# Patient Record
Sex: MALE | Race: WHITE | NOT HISPANIC OR LATINO | Employment: UNEMPLOYED | URBAN - METROPOLITAN AREA
[De-identification: names, ages, dates, MRNs, and addresses within clinical notes are randomized per-mention and may not be internally consistent; named-entity substitution may affect disease eponyms.]

---

## 2023-03-20 DIAGNOSIS — M54.12 CERVICAL RADICULOPATHY: Primary | ICD-10-CM

## 2023-04-20 ENCOUNTER — OFFICE VISIT (OUTPATIENT)
Dept: PAIN MEDICINE | Facility: CLINIC | Age: 73
End: 2023-04-20
Payer: MEDICARE

## 2023-04-20 VITALS
HEART RATE: 67 BPM | WEIGHT: 226 LBS | BODY MASS INDEX: 31.64 KG/M2 | SYSTOLIC BLOOD PRESSURE: 151 MMHG | DIASTOLIC BLOOD PRESSURE: 88 MMHG | OXYGEN SATURATION: 97 % | HEIGHT: 71 IN

## 2023-04-20 DIAGNOSIS — M47.812 CERVICAL SPONDYLOSIS: ICD-10-CM

## 2023-04-20 DIAGNOSIS — G89.29 CHRONIC PAIN OF BOTH SHOULDERS: ICD-10-CM

## 2023-04-20 DIAGNOSIS — G89.29 CHRONIC RIGHT-SIDED LOW BACK PAIN WITH RIGHT-SIDED SCIATICA: ICD-10-CM

## 2023-04-20 DIAGNOSIS — M25.512 CHRONIC PAIN OF BOTH SHOULDERS: ICD-10-CM

## 2023-04-20 DIAGNOSIS — M54.2 CHRONIC NECK PAIN: Primary | ICD-10-CM

## 2023-04-20 DIAGNOSIS — M25.511 CHRONIC PAIN OF BOTH SHOULDERS: ICD-10-CM

## 2023-04-20 DIAGNOSIS — M43.12 SPONDYLOLISTHESIS OF CERVICAL REGION: ICD-10-CM

## 2023-04-20 DIAGNOSIS — M54.12 CERVICAL RADICULOPATHY: ICD-10-CM

## 2023-04-20 DIAGNOSIS — M75.42 IMPINGEMENT SYNDROME OF BOTH SHOULDERS: ICD-10-CM

## 2023-04-20 DIAGNOSIS — R29.3 POOR POSTURE: ICD-10-CM

## 2023-04-20 DIAGNOSIS — G89.29 CHRONIC NECK PAIN: Primary | ICD-10-CM

## 2023-04-20 DIAGNOSIS — M75.41 IMPINGEMENT SYNDROME OF BOTH SHOULDERS: ICD-10-CM

## 2023-04-20 DIAGNOSIS — M54.41 CHRONIC RIGHT-SIDED LOW BACK PAIN WITH RIGHT-SIDED SCIATICA: ICD-10-CM

## 2023-04-20 PROCEDURE — 3077F SYST BP >= 140 MM HG: CPT | Mod: CPTII,S$GLB,, | Performed by: PHYSICAL MEDICINE & REHABILITATION

## 2023-04-20 PROCEDURE — 3008F BODY MASS INDEX DOCD: CPT | Mod: CPTII,S$GLB,, | Performed by: PHYSICAL MEDICINE & REHABILITATION

## 2023-04-20 PROCEDURE — 1160F RVW MEDS BY RX/DR IN RCRD: CPT | Mod: CPTII,S$GLB,, | Performed by: PHYSICAL MEDICINE & REHABILITATION

## 2023-04-20 PROCEDURE — 1159F MED LIST DOCD IN RCRD: CPT | Mod: CPTII,S$GLB,, | Performed by: PHYSICAL MEDICINE & REHABILITATION

## 2023-04-20 PROCEDURE — 4010F ACE/ARB THERAPY RXD/TAKEN: CPT | Mod: CPTII,S$GLB,, | Performed by: PHYSICAL MEDICINE & REHABILITATION

## 2023-04-20 PROCEDURE — 99204 OFFICE O/P NEW MOD 45 MIN: CPT | Mod: S$GLB,,, | Performed by: PHYSICAL MEDICINE & REHABILITATION

## 2023-04-20 PROCEDURE — 3008F PR BODY MASS INDEX (BMI) DOCUMENTED: ICD-10-PCS | Mod: CPTII,S$GLB,, | Performed by: PHYSICAL MEDICINE & REHABILITATION

## 2023-04-20 PROCEDURE — 4010F PR ACE/ARB THEARPY RXD/TAKEN: ICD-10-PCS | Mod: CPTII,S$GLB,, | Performed by: PHYSICAL MEDICINE & REHABILITATION

## 2023-04-20 PROCEDURE — 3079F DIAST BP 80-89 MM HG: CPT | Mod: CPTII,S$GLB,, | Performed by: PHYSICAL MEDICINE & REHABILITATION

## 2023-04-20 PROCEDURE — 1160F PR REVIEW ALL MEDS BY PRESCRIBER/CLIN PHARMACIST DOCUMENTED: ICD-10-PCS | Mod: CPTII,S$GLB,, | Performed by: PHYSICAL MEDICINE & REHABILITATION

## 2023-04-20 PROCEDURE — 3079F PR MOST RECENT DIASTOLIC BLOOD PRESSURE 80-89 MM HG: ICD-10-PCS | Mod: CPTII,S$GLB,, | Performed by: PHYSICAL MEDICINE & REHABILITATION

## 2023-04-20 PROCEDURE — 99204 PR OFFICE/OUTPT VISIT, NEW, LEVL IV, 45-59 MIN: ICD-10-PCS | Mod: S$GLB,,, | Performed by: PHYSICAL MEDICINE & REHABILITATION

## 2023-04-20 PROCEDURE — 1159F PR MEDICATION LIST DOCUMENTED IN MEDICAL RECORD: ICD-10-PCS | Mod: CPTII,S$GLB,, | Performed by: PHYSICAL MEDICINE & REHABILITATION

## 2023-04-20 PROCEDURE — 3077F PR MOST RECENT SYSTOLIC BLOOD PRESSURE >= 140 MM HG: ICD-10-PCS | Mod: CPTII,S$GLB,, | Performed by: PHYSICAL MEDICINE & REHABILITATION

## 2023-04-20 RX ORDER — VALSARTAN 160 MG/1
160 TABLET ORAL NIGHTLY
COMMUNITY
Start: 2023-03-02

## 2023-04-20 RX ORDER — GABAPENTIN 300 MG/1
300 CAPSULE ORAL NIGHTLY
Qty: 30 CAPSULE | Refills: 11 | Status: SHIPPED | OUTPATIENT
Start: 2023-04-20 | End: 2024-04-14

## 2023-04-20 RX ORDER — NAPROXEN SODIUM 220 MG
220 TABLET ORAL 2 TIMES DAILY WITH MEALS
COMMUNITY

## 2023-04-20 RX ORDER — ROSUVASTATIN CALCIUM 20 MG/1
20 TABLET, COATED ORAL NIGHTLY
COMMUNITY
Start: 2023-03-16

## 2023-04-20 RX ORDER — DULAGLUTIDE 3 MG/.5ML
INJECTION, SOLUTION SUBCUTANEOUS
COMMUNITY
Start: 2023-04-03

## 2023-04-20 NOTE — PROGRESS NOTES
[facility]  New Patient H&P    Referring Provider: Brian Cornejo Md  [address]  [address]  [place] [address]    Chief Complaint:   Chief Complaint   Patient presents with    Neck Pain       History of Present Illness: Ricky Yeboah is a 73 y.o. male referred by Dr. Brian Cornejo for neck pain.      Neck pain  Onset: about a year ago, no specific inciting event and pain worsened about 6 months ago.   Location: bilateral neck and bilateral shuolders  Radiation: into back of head and into bilateral arms down to hands (whole hand)  Timing: intermittent  Quality: Tight and Numb  Exacerbating Factors: nothing in particular  Alleviating Factors: nothing and medications    Back pain has been present for many years. Pain started when he was running track and doing hurdles in high school. Pain is localized to the right SIJ area with occasional radiation down the right posterior leg to the calf at times. The pain is aggravated by mowing grass in riding lawnmower. The pain is alleviated by heel lift in the right shoe, naproxen. He denies weakness or numbness in the legs. Denies changes in bowel or bladder function. Denies saddle anesthesia. Denies recent fevers or infections. Denies unexplained weight loss    Associated Symptoms: He gets numbness in both arms and hands, primarily in the left. He felt weak in both arms, primarily the left. He denies night fever/night sweats, urinary incontinence/change in function, bowel incontinence/change in function, and unexplained weight loss    He saw a chiropractor which helped.     Severity: Currently: 4/10   Typical Range: 4-5/10     Exacerbation: 7/10     P = 7  E = 4  G = 3  Baseline PEG Score = 4.67  Current PEG Score: 4.67    Opioid Risk Score         Value Time User    Opioid Risk Score  0 4/20/2023  2:13 PM Dena Tom MD             Previous Interventions:  -     Previous Therapies:  PT/OT: no   Chiropractor: Yes  Relevant Surgery: no  "  Previous Medications:   - NSAIDS: naproxen and tylenol help.   - Muscle Relaxants:    - TCAs:   - SNRIs:   - Topicals:   - Anticonvulsants:    - Opioids:     Current Pain Medications:  Tylenol  Naproxen     Blood Thinners: None    Full Medication List:    Current Outpatient Medications:     acetaminophen (TYLENOL ARTHRITIS ORAL), Take by mouth., Disp: , Rfl:     naproxen sodium (ANAPROX) 220 MG tablet, Take 220 mg by mouth 2 (two) times daily with meals., Disp: , Rfl:     rosuvastatin (CRESTOR) 20 MG tablet, Take 20 mg by mouth every evening., Disp: , Rfl:     TRULICITY 3 mg/0.5 mL pen injector, INJECT 1 SUBCUTANEOUSLY ONCE A WEEK, Disp: , Rfl:     valsartan (DIOVAN) 160 MG tablet, Take 160 mg by mouth every evening., Disp: , Rfl:     gabapentin (NEURONTIN) 300 MG capsule, Take 1 capsule (300 mg total) by mouth every evening., Disp: 30 capsule, Rfl: 11     Review of Systems:  ROS    Allergies:  Patient has no known allergies.     Medical History:   has a past medical history of DM (diabetes mellitus), HTN (hypertension), and Mixed hyperlipidemia.    Surgical History:   has a past surgical history that includes Vasectomy; Cataract extraction; and Hernia repair.    Family History:  family history includes Heart disease in his father.    Social History:   reports that he has never smoked. He has never used smokeless tobacco. He reports that he does not currently use alcohol.    Physical Exam:  BP (!) 151/88 (BP Location: Left arm, Patient Position: Sitting)   Pulse 67   Ht 5' 11" (1.803 m)   Wt 102.5 kg (226 lb)   SpO2 97%   BMI 31.52 kg/m²   GEN: No acute distress. Calm, comfortable  HENT: Normocephalic, atraumatic, moist mucous membranes  EYE: Anicteric sclera, non-injected.   CV: Non-diaphoretic. Regular Rate. Radial Pulses 2+.  RESP: Breathing comfortably. Chest expansion symmetric.  EXT: No clubbing, cyanosis.   SKIN: Warm, & dry to palpation. No visible rashes or lesions of exposed skin.   PSYCH: Pleasant " mood and appropriate affect. Recent and remote memory intact.   GAIT: Independent, normal ambulation  Neck Exam:       Inspection: No erythema, bruising. Forward head and rounded shoulders      Palpation: (+) TTP of bilateral cervical paraspinals, b/l trapezius      ROM:  Limitation in extension, lateral bending b/l or right rotation. Pain with extension and right rotation      Provocative Maneuvers:  (-) Spurling's bilaterally  Shoulder Exam:       Inspection: No erythema, bruising.       Palpation: (+) TTP of b/l proximal biceps tendon and left subacromial area.       ROM:  Limited in abduction, internal rotation b/l      Provocative Maneuvers:  (+) Hawkin's b/l       (+) Empty Can b/l       (+) Speed's on left   (-) Drop arm b/l  Lumbar Spine Exam:       Inspection: No erythema, bruising. Loss of lordosis      Palpation: (+) TTP of lumbar paraspinals and SIJ on right       ROM:  Limited in flexion, extension, lateral bending.       (+) Facet loading on right      (-) Straight Leg Raise bilaterally      (+) GUS on right  Neurologic Exam:     Alert. Speech is fluent and appropriate.     Strength: 4/5 in left elbow flex/ext and finger flexion, otherwise 5/5 throughout bilateral upper & lower extremities     Sensation:  Grossly intact to light touch in bilateral upper & lower extremities     Reflexes: 2+ in b/l patella, achilles, biceps, brachioradialis, triceps     Tone: No abnormality appreciated in bilateral upper or lower extremities     (-) Gonzáles bilaterally            Imaging:  - MRI C-spine 1/17/23:   The height of the cervical vertebral bodies is maintained. There is slight reversal of the normal lordotic curvature which may be due to muscle spasm or patient positioning. There is some disc space narrowing and loss of disc signal from the C2-3 through C6-7 levels consistent with disc desiccation discogenic change. Just above the C1 level at the junction of the lower medulla and most upper aspect of the  cord is a very small 1-2 mm focus of increased signal within the cord. This is of uncertain significance. This could represent a small amount of fluid within the central canal. No axial images through this level were obtained. Some additional images of the upper cord in this region with and without contrast are recommended to exclude a contrast enhancing lesion. The cervical cord is otherwise unremarkable.  C2-3: No disc herniation central canal stenosis or neural canal narrowing is seen. Note is made of some hypertrophic arthropathic changes of the dens but no mass-effect on the cord or central canal compromise at the C1 level is seen.  C3-4: There is a broad-based disc herniation or disc osteophyte complex extending approximately 3 mm beyond the vertebral body margin effacing the ventral aspect of the thecal sac but causing no cord deformity or central canal stenosis. There is neural canal narrowing bilaterally due to uncinate facet hypertrophy.  C4-5: There is 1-2 mm of disc bulging and osteophytic ridging. No mass-effect on the cord is seen. No central canal stenosis is seen. There is neural canal narrowing bilaterally left greater than right due to uncinate facet hypertrophy.  C5-6: There is a broad-based disc herniation or disc osteophyte complex eccentric to the right extending approximately 2-3 mm beyond the vertebral body margin abutting or minimally impinging on the right ventral aspect of the cord. No central canal stenosis is seen. There is neural canal narrowing bilaterally due to uncinate facet hypertrophy.  C6-7: There is disc bulging and osteophytic ridging with some mass effect on the thecal sac but no mass-effect on the cord. Disc extends approximately 1-2 mm beyond the vertebral body margin. No central canal stenosis is seen. There is neural canal narrowing bilaterally due to uncinate facet hypertrophy  C7-T1: There is mild neural canal narrowing due to uncinate facet hypertrophy bilaterally. No  central canal stenosis is seen. No disc herniation is seen      Labs:  BMP  No results found for: NA, K, CL, CO2, BUN, CREATININE, CALCIUM, ANIONGAP, EGFRNORACEVR  No results found for: ALT, AST, GGT, ALKPHOS, BILITOT  No results found for: PLT    Assessment:  Ricky Yeboah is a 73 y.o. male with the following diagnoses based on history, exam, and imaging:    Problem List Items Addressed This Visit    None  Visit Diagnoses       Chronic neck pain    -  Primary    Relevant Medications    gabapentin (NEURONTIN) 300 MG capsule    Other Relevant Orders    Ambulatory referral/consult to Physical/Occupational Therapy    X-Ray Cervical Spine AP Lat with Flexion  Extension    Chronic pain of both shoulders        Relevant Medications    gabapentin (NEURONTIN) 300 MG capsule    Other Relevant Orders    X-Ray Shoulder Complete Bilateral    Poor posture        Impingement syndrome of both shoulders        Relevant Medications    gabapentin (NEURONTIN) 300 MG capsule    Other Relevant Orders    Ambulatory referral/consult to Physical/Occupational Therapy    X-Ray Shoulder Complete Bilateral    Cervical radiculopathy        Relevant Medications    gabapentin (NEURONTIN) 300 MG capsule    Other Relevant Orders    X-Ray Cervical Spine AP Lat with Flexion  Extension    Chronic right-sided low back pain with right-sided sciatica        Relevant Medications    gabapentin (NEURONTIN) 300 MG capsule    Other Relevant Orders    Ambulatory referral/consult to Physical/Occupational Therapy    X-Ray Lumbar Spine AP And Lateral    Spondylolisthesis of cervical region        Relevant Orders    X-Ray Cervical Spine AP Lat with Flexion  Extension    Cervical spondylosis        Relevant Orders    X-Ray Cervical Spine AP Lat with Flexion  Extension            This is a pleasant 73 y.o. gentleman presenting with:     - Chronic neck pain and bilateral radicular arm pains: Bilateral foraminal stenosis at multiple levels. Diffuse facet  arthropathy.  - Chronic bilateral shoulder pain: Impingement signs present b/l with biceps tendonopathy on the left as well.  - Chronic right low back pain primarily over right SIJ and with (+) facet provocation as well.   - Comorbidities: HTN.    Treatment Plan:   - PT/OT/HEP: Refer to PT. Discussed benefits of exercise for pain.   - Procedures: Neck and shoulder pain are both doing okay currently and he does not feel like he needs injection at this time.    - Advised him to call if pain worsens, and we will schedule for C7-T1 IL GIANLUCA if pain is similar to what he described today   - Consider subacromial bursa CSI   - Consider right SIJ CSI vs right L4-5, L5-S1 diagnostic MBBs for back pain  - Medications:    - Rx for gabapentin 300 mg qHS for sleep and pain  - Imaging: Reviewed. X-ray of L-spine, C-spine, b/l shoulders  - Labs: Request CBC, CMP, A1c from PCP      Follow Up: RTC in 2-3 months or sooner PREFREN Tom M.D.  Interventional Pain Medicine / Physical Medicine & Rehabilitation

## 2023-04-25 ENCOUNTER — TELEPHONE (OUTPATIENT)
Dept: PAIN MEDICINE | Facility: CLINIC | Age: 73
End: 2023-04-25
Payer: MEDICARE

## 2023-04-25 NOTE — TELEPHONE ENCOUNTER
----- Message from Maricel Quiros sent at 4/24/2023  4:56 PM CDT -----  Contact: self    ----- Message -----  From: Daniella Krishnamurthy  Sent: 4/24/2023   3:57 PM CDT  To: Vaishali Fernandes Staff    Pt needs to reschedule x-ray due to [employer] duty pls call [phone] with new appt details

## 2023-07-19 ENCOUNTER — OFFICE VISIT (OUTPATIENT)
Dept: PAIN MEDICINE | Facility: CLINIC | Age: 73
End: 2023-07-19
Payer: MEDICARE

## 2023-07-19 VITALS
HEART RATE: 72 BPM | DIASTOLIC BLOOD PRESSURE: 83 MMHG | BODY MASS INDEX: 31.54 KG/M2 | HEIGHT: 71 IN | OXYGEN SATURATION: 96 % | RESPIRATION RATE: 18 BRPM | SYSTOLIC BLOOD PRESSURE: 127 MMHG | WEIGHT: 225.31 LBS

## 2023-07-19 DIAGNOSIS — M47.812 CERVICAL SPONDYLOSIS: ICD-10-CM

## 2023-07-19 DIAGNOSIS — G89.29 CHRONIC RIGHT-SIDED LOW BACK PAIN WITH RIGHT-SIDED SCIATICA: ICD-10-CM

## 2023-07-19 DIAGNOSIS — G89.29 CHRONIC NECK PAIN: ICD-10-CM

## 2023-07-19 DIAGNOSIS — M75.41 IMPINGEMENT SYNDROME OF BOTH SHOULDERS: ICD-10-CM

## 2023-07-19 DIAGNOSIS — M25.511 CHRONIC PAIN OF BOTH SHOULDERS: Primary | ICD-10-CM

## 2023-07-19 DIAGNOSIS — M43.12 SPONDYLOLISTHESIS OF CERVICAL REGION: ICD-10-CM

## 2023-07-19 DIAGNOSIS — M54.12 CERVICAL RADICULOPATHY: ICD-10-CM

## 2023-07-19 DIAGNOSIS — G89.29 CHRONIC PAIN OF BOTH SHOULDERS: Primary | ICD-10-CM

## 2023-07-19 DIAGNOSIS — M54.2 CHRONIC NECK PAIN: ICD-10-CM

## 2023-07-19 DIAGNOSIS — M54.41 CHRONIC RIGHT-SIDED LOW BACK PAIN WITH RIGHT-SIDED SCIATICA: ICD-10-CM

## 2023-07-19 DIAGNOSIS — M75.42 IMPINGEMENT SYNDROME OF BOTH SHOULDERS: ICD-10-CM

## 2023-07-19 DIAGNOSIS — M25.512 CHRONIC PAIN OF BOTH SHOULDERS: Primary | ICD-10-CM

## 2023-07-19 PROCEDURE — 3079F DIAST BP 80-89 MM HG: CPT | Mod: CPTII,S$GLB,, | Performed by: PHYSICAL MEDICINE & REHABILITATION

## 2023-07-19 PROCEDURE — 1160F RVW MEDS BY RX/DR IN RCRD: CPT | Mod: CPTII,S$GLB,, | Performed by: PHYSICAL MEDICINE & REHABILITATION

## 2023-07-19 PROCEDURE — 1101F PR PT FALLS ASSESS DOC 0-1 FALLS W/OUT INJ PAST YR: ICD-10-PCS | Mod: CPTII,S$GLB,, | Performed by: PHYSICAL MEDICINE & REHABILITATION

## 2023-07-19 PROCEDURE — 1101F PT FALLS ASSESS-DOCD LE1/YR: CPT | Mod: CPTII,S$GLB,, | Performed by: PHYSICAL MEDICINE & REHABILITATION

## 2023-07-19 PROCEDURE — 1160F PR REVIEW ALL MEDS BY PRESCRIBER/CLIN PHARMACIST DOCUMENTED: ICD-10-PCS | Mod: CPTII,S$GLB,, | Performed by: PHYSICAL MEDICINE & REHABILITATION

## 2023-07-19 PROCEDURE — 4010F PR ACE/ARB THEARPY RXD/TAKEN: ICD-10-PCS | Mod: CPTII,S$GLB,, | Performed by: PHYSICAL MEDICINE & REHABILITATION

## 2023-07-19 PROCEDURE — 3079F PR MOST RECENT DIASTOLIC BLOOD PRESSURE 80-89 MM HG: ICD-10-PCS | Mod: CPTII,S$GLB,, | Performed by: PHYSICAL MEDICINE & REHABILITATION

## 2023-07-19 PROCEDURE — 3008F BODY MASS INDEX DOCD: CPT | Mod: CPTII,S$GLB,, | Performed by: PHYSICAL MEDICINE & REHABILITATION

## 2023-07-19 PROCEDURE — 3074F PR MOST RECENT SYSTOLIC BLOOD PRESSURE < 130 MM HG: ICD-10-PCS | Mod: CPTII,S$GLB,, | Performed by: PHYSICAL MEDICINE & REHABILITATION

## 2023-07-19 PROCEDURE — 1159F PR MEDICATION LIST DOCUMENTED IN MEDICAL RECORD: ICD-10-PCS | Mod: CPTII,S$GLB,, | Performed by: PHYSICAL MEDICINE & REHABILITATION

## 2023-07-19 PROCEDURE — 99213 OFFICE O/P EST LOW 20 MIN: CPT | Mod: S$GLB,,, | Performed by: PHYSICAL MEDICINE & REHABILITATION

## 2023-07-19 PROCEDURE — 99213 PR OFFICE/OUTPT VISIT, EST, LEVL III, 20-29 MIN: ICD-10-PCS | Mod: S$GLB,,, | Performed by: PHYSICAL MEDICINE & REHABILITATION

## 2023-07-19 PROCEDURE — 3288F PR FALLS RISK ASSESSMENT DOCUMENTED: ICD-10-PCS | Mod: CPTII,S$GLB,, | Performed by: PHYSICAL MEDICINE & REHABILITATION

## 2023-07-19 PROCEDURE — 3074F SYST BP LT 130 MM HG: CPT | Mod: CPTII,S$GLB,, | Performed by: PHYSICAL MEDICINE & REHABILITATION

## 2023-07-19 PROCEDURE — 3008F PR BODY MASS INDEX (BMI) DOCUMENTED: ICD-10-PCS | Mod: CPTII,S$GLB,, | Performed by: PHYSICAL MEDICINE & REHABILITATION

## 2023-07-19 PROCEDURE — 3288F FALL RISK ASSESSMENT DOCD: CPT | Mod: CPTII,S$GLB,, | Performed by: PHYSICAL MEDICINE & REHABILITATION

## 2023-07-19 PROCEDURE — 4010F ACE/ARB THERAPY RXD/TAKEN: CPT | Mod: CPTII,S$GLB,, | Performed by: PHYSICAL MEDICINE & REHABILITATION

## 2023-07-19 PROCEDURE — 1159F MED LIST DOCD IN RCRD: CPT | Mod: CPTII,S$GLB,, | Performed by: PHYSICAL MEDICINE & REHABILITATION

## 2023-07-19 NOTE — PROGRESS NOTES
[facility]      Chief Complaint:   No chief complaint on file.      History of Present Illness: Ricky Yeboah is a 73 y.o. male referred by Dr. Brian Cornejo for neck pain.      Neck pain  Onset: about a year ago, no specific inciting event and pain worsened about 6 months ago.   Location: bilateral neck and bilateral shuolders  Radiation: into back of head and into bilateral arms down to hands (whole hand)  Timing: intermittent  Quality: Tight and Numb  Exacerbating Factors: nothing in particular  Alleviating Factors: nothing and medications    Back pain has been present for many years. Pain started when he was running track and doing hurdles in high school. Pain is localized to the right SIJ area with occasional radiation down the right posterior leg to the calf at times. The pain is aggravated by mowing grass in riding lawnmower. The pain is alleviated by heel lift in the right shoe, naproxen. He denies weakness or numbness in the legs. Denies changes in bowel or bladder function. Denies saddle anesthesia. Denies recent fevers or infections. Denies unexplained weight loss    Associated Symptoms: He gets numbness in both arms and hands, primarily in the left. He felt weak in both arms, primarily the left. He denies night fever/night sweats, urinary incontinence/change in function, bowel incontinence/change in function, and unexplained weight loss    He saw a chiropractor which helped.     Severity: Currently: 4/10   Typical Range: 4-5/10     Exacerbation: 7/10     P = 7  E = 4  G = 3  Baseline PEG Score = 4.67    Interval History (07/19/2023):  Ricky Yeboah returns today for follow up.  At the last clinic visit, referred to PT.    Currently, the neck and back pain is improved.  He has even been working more around his yard and is still doing really well. PT helped him. He continues to take the gabapentin which also seems to help.       Current Pain Scales:  Current: 0/10               "Typical Range: 0-2/10    Current PEG Score: 0.33    Opioid Risk Score         Value Time User    Opioid Risk Score  0 4/20/2023  2:13 PM Dena Tom MD             Previous Interventions:  -     Previous Therapies:  PT/OT: no   Chiropractor: Yes  Relevant Surgery: no   Previous Medications:   - NSAIDS: naproxen and tylenol help.   - Muscle Relaxants:    - TCAs:   - SNRIs:   - Topicals:   - Anticonvulsants:    - Opioids:     Current Pain Medications:  Tylenol  Naproxen     Blood Thinners: None    Full Medication List:    Current Outpatient Medications:     acetaminophen (TYLENOL ARTHRITIS ORAL), Take by mouth., Disp: , Rfl:     gabapentin (NEURONTIN) 300 MG capsule, Take 1 capsule (300 mg total) by mouth every evening., Disp: 30 capsule, Rfl: 11    naproxen sodium (ANAPROX) 220 MG tablet, Take 220 mg by mouth 2 (two) times daily with meals., Disp: , Rfl:     rosuvastatin (CRESTOR) 20 MG tablet, Take 20 mg by mouth every evening., Disp: , Rfl:     TRULICITY 3 mg/0.5 mL pen injector, INJECT 1 SUBCUTANEOUSLY ONCE A WEEK, Disp: , Rfl:     valsartan (DIOVAN) 160 MG tablet, Take 160 mg by mouth every evening., Disp: , Rfl:      Review of Systems:  ROS    Allergies:  Patient has no known allergies.     Medical History:   has a past medical history of DM (diabetes mellitus), HTN (hypertension), and Mixed hyperlipidemia.    Surgical History:   has a past surgical history that includes Vasectomy; Cataract extraction; and Hernia repair.    Family History:  family history includes Heart disease in his father.    Social History:   reports that he has never smoked. He has never used smokeless tobacco. He reports that he does not currently use alcohol.    Physical Exam:  /83   Pulse 72   Resp 18   Ht 5' 11" (1.803 m)   Wt 102.2 kg (225 lb 4.8 oz)   SpO2 96%   BMI 31.42 kg/m²   GEN: No acute distress. Calm, comfortable  HENT: Normocephalic, atraumatic, moist mucous membranes  EYE: Anicteric sclera, non-injected. "   CV: Non-diaphoretic. Regular Rate. Radial Pulses 2+.  RESP: Breathing comfortably. Chest expansion symmetric.  EXT: No clubbing, cyanosis.   SKIN: Warm, & dry to palpation. No visible rashes or lesions of exposed skin.   PSYCH: Pleasant mood and appropriate affect. Recent and remote memory intact.   GAIT: Independent, normal ambulation  Neck Exam:       Inspection: No erythema, bruising. Forward head and rounded shoulders      Palpation: (+) TTP of bilateral cervical paraspinals, b/l trapezius      ROM:  Limitation in extension, lateral bending b/l or right rotation. Pain with extension and right rotation      Provocative Maneuvers:  (-) Spurling's bilaterally  Shoulder Exam:       Inspection: No erythema, bruising.       Palpation: (+) TTP of b/l proximal biceps tendon and left subacromial area.       ROM:  Limited in abduction, internal rotation b/l      Provocative Maneuvers:  (+) Hawkin's b/l       (+) Empty Can b/l       (+) Speed's on left   (-) Drop arm b/l  Lumbar Spine Exam:       Inspection: No erythema, bruising. Loss of lordosis      Palpation: (+) TTP of lumbar paraspinals and SIJ on right       ROM:  Limited in flexion, extension, lateral bending.       (+) Facet loading on right      (-) Straight Leg Raise bilaterally      (+) GUS on right  Neurologic Exam:     Alert. Speech is fluent and appropriate.     Strength: 4/5 in left elbow flex/ext and finger flexion, otherwise 5/5 throughout bilateral upper & lower extremities     Sensation:  Grossly intact to light touch in bilateral upper & lower extremities     Reflexes: 2+ in b/l patella, achilles, biceps, brachioradialis, triceps     Tone: No abnormality appreciated in bilateral upper or lower extremities     (-) Gonzáles bilaterally            Imaging:  - MRI C-spine 1/17/23:   The height of the cervical vertebral bodies is maintained. There is slight reversal of the normal lordotic curvature which may be due to muscle spasm or patient positioning.  There is some disc space narrowing and loss of disc signal from the C2-3 through C6-7 levels consistent with disc desiccation discogenic change. Just above the C1 level at the junction of the lower medulla and most upper aspect of the cord is a very small 1-2 mm focus of increased signal within the cord. This is of uncertain significance. This could represent a small amount of fluid within the central canal. No axial images through this level were obtained. Some additional images of the upper cord in this region with and without contrast are recommended to exclude a contrast enhancing lesion. The cervical cord is otherwise unremarkable.  C2-3: No disc herniation central canal stenosis or neural canal narrowing is seen. Note is made of some hypertrophic arthropathic changes of the dens but no mass-effect on the cord or central canal compromise at the C1 level is seen.  C3-4: There is a broad-based disc herniation or disc osteophyte complex extending approximately 3 mm beyond the vertebral body margin effacing the ventral aspect of the thecal sac but causing no cord deformity or central canal stenosis. There is neural canal narrowing bilaterally due to uncinate facet hypertrophy.  C4-5: There is 1-2 mm of disc bulging and osteophytic ridging. No mass-effect on the cord is seen. No central canal stenosis is seen. There is neural canal narrowing bilaterally left greater than right due to uncinate facet hypertrophy.  C5-6: There is a broad-based disc herniation or disc osteophyte complex eccentric to the right extending approximately 2-3 mm beyond the vertebral body margin abutting or minimally impinging on the right ventral aspect of the cord. No central canal stenosis is seen. There is neural canal narrowing bilaterally due to uncinate facet hypertrophy.  C6-7: There is disc bulging and osteophytic ridging with some mass effect on the thecal sac but no mass-effect on the cord. Disc extends approximately 1-2 mm beyond  the vertebral body margin. No central canal stenosis is seen. There is neural canal narrowing bilaterally due to uncinate facet hypertrophy  C7-T1: There is mild neural canal narrowing due to uncinate facet hypertrophy bilaterally. No central canal stenosis is seen. No disc herniation is seen      Labs:  BMP  No results found for: NA, K, CL, CO2, BUN, CREATININE, CALCIUM, ANIONGAP, EGFRNORACEVR  No results found for: ALT, AST, GGT, ALKPHOS, BILITOT  No results found for: PLT    Assessment:  Ricky Yeboah is a 73 y.o. male with the following diagnoses based on history, exam, and imaging:    Problem List Items Addressed This Visit    None  Visit Diagnoses       Chronic pain of both shoulders    -  Primary    Chronic neck pain        Impingement syndrome of both shoulders        Cervical radiculopathy        Chronic right-sided low back pain with right-sided sciatica        Spondylolisthesis of cervical region        Cervical spondylosis                  This is a pleasant 73 y.o. gentleman presenting with:     - Chronic neck pain and bilateral radicular arm pains: Bilateral foraminal stenosis at multiple levels. Diffuse facet arthropathy.  - Chronic bilateral shoulder pain: Impingement signs present b/l with biceps tendonopathy on the left as well.  - Chronic right low back pain primarily over right SIJ and with (+) facet provocation as well.   - Comorbidities: HTN.    Treatment Plan:   - PT/OT/HEP: Cont HEP learned at PT. Discussed benefits of exercise for pain.   - Procedures: Neck and shoulder pain are both doing okay currently and he does not feel like he needs injection at this time.    - Advised him to call if pain worsens, and we will schedule for C7-T1 IL GIANLUCA if pain is similar to what he described today   - Consider subacromial bursa CSI   - Consider right SIJ CSI vs right L4-5, L5-S1 diagnostic MBBs for back pain  - Medications:    - Cont gabapentin 300 mg qHS for sleep and pain as this is providing  good benefit.   - Imaging: Reviewed. None  - Labs: None      Follow Up: RTC PRN    Dena Tom M.D.  Interventional Pain Medicine / Physical Medicine & Rehabilitation

## 2024-09-23 DIAGNOSIS — M25.811 IMPINGEMENT OF RIGHT SHOULDER: Primary | ICD-10-CM

## 2024-09-24 ENCOUNTER — TELEPHONE (OUTPATIENT)
Dept: PAIN MEDICINE | Facility: CLINIC | Age: 74
End: 2024-09-24
Payer: MEDICARE

## 2024-09-25 ENCOUNTER — TELEPHONE (OUTPATIENT)
Dept: PAIN MEDICINE | Facility: CLINIC | Age: 74
End: 2024-09-25
Payer: MEDICARE

## 2024-10-09 ENCOUNTER — OFFICE VISIT (OUTPATIENT)
Dept: PAIN MEDICINE | Facility: CLINIC | Age: 74
End: 2024-10-09
Payer: MEDICARE

## 2024-10-09 VITALS
OXYGEN SATURATION: 96 % | BODY MASS INDEX: 29.54 KG/M2 | HEART RATE: 69 BPM | DIASTOLIC BLOOD PRESSURE: 75 MMHG | HEIGHT: 71 IN | SYSTOLIC BLOOD PRESSURE: 116 MMHG | WEIGHT: 211 LBS

## 2024-10-09 DIAGNOSIS — M47.812 CERVICAL SPONDYLOSIS: ICD-10-CM

## 2024-10-09 DIAGNOSIS — M43.12 SPONDYLOLISTHESIS OF CERVICAL REGION: ICD-10-CM

## 2024-10-09 DIAGNOSIS — G89.29 CHRONIC PAIN OF BOTH SHOULDERS: ICD-10-CM

## 2024-10-09 DIAGNOSIS — M75.41 IMPINGEMENT SYNDROME OF BOTH SHOULDERS: ICD-10-CM

## 2024-10-09 DIAGNOSIS — M75.42 IMPINGEMENT SYNDROME OF BOTH SHOULDERS: ICD-10-CM

## 2024-10-09 DIAGNOSIS — M25.512 CHRONIC PAIN OF BOTH SHOULDERS: ICD-10-CM

## 2024-10-09 DIAGNOSIS — G89.29 CHRONIC RIGHT-SIDED LOW BACK PAIN WITH RIGHT-SIDED SCIATICA: ICD-10-CM

## 2024-10-09 DIAGNOSIS — M54.41 CHRONIC RIGHT-SIDED LOW BACK PAIN WITH RIGHT-SIDED SCIATICA: ICD-10-CM

## 2024-10-09 DIAGNOSIS — G89.29 CHRONIC NECK PAIN: ICD-10-CM

## 2024-10-09 DIAGNOSIS — M54.2 CHRONIC NECK PAIN: ICD-10-CM

## 2024-10-09 DIAGNOSIS — M25.511 CHRONIC PAIN OF BOTH SHOULDERS: ICD-10-CM

## 2024-10-09 DIAGNOSIS — M54.12 CERVICAL RADICULOPATHY: ICD-10-CM

## 2024-10-09 DIAGNOSIS — M25.811 IMPINGEMENT OF RIGHT SHOULDER: Primary | ICD-10-CM

## 2024-10-09 RX ORDER — SEMAGLUTIDE 0.68 MG/ML
INJECTION, SOLUTION SUBCUTANEOUS
COMMUNITY
Start: 2024-09-20

## 2024-10-09 RX ORDER — GABAPENTIN 300 MG/1
300 CAPSULE ORAL NIGHTLY
Qty: 30 CAPSULE | Refills: 11 | Status: SHIPPED | OUTPATIENT
Start: 2024-10-09 | End: 2025-10-04

## 2024-10-09 RX ORDER — LIDOCAINE HYDROCHLORIDE 10 MG/ML
2 INJECTION, SOLUTION INFILTRATION; PERINEURAL
Status: DISCONTINUED | OUTPATIENT
Start: 2024-10-09 | End: 2024-10-09 | Stop reason: HOSPADM

## 2024-10-09 RX ORDER — DAPAGLIFLOZIN 10 MG/1
10 TABLET, FILM COATED ORAL DAILY
COMMUNITY

## 2024-10-09 RX ORDER — METHYLPREDNISOLONE ACETATE 40 MG/ML
40 INJECTION, SUSPENSION INTRA-ARTICULAR; INTRALESIONAL; INTRAMUSCULAR; SOFT TISSUE
Status: DISCONTINUED | OUTPATIENT
Start: 2024-10-09 | End: 2024-10-09 | Stop reason: HOSPADM

## 2024-10-09 RX ADMIN — METHYLPREDNISOLONE ACETATE 40 MG: 40 INJECTION, SUSPENSION INTRA-ARTICULAR; INTRALESIONAL; INTRAMUSCULAR; SOFT TISSUE at 11:10

## 2024-10-09 RX ADMIN — LIDOCAINE HYDROCHLORIDE 2 ML: 10 INJECTION, SOLUTION INFILTRATION; PERINEURAL at 11:10

## 2024-10-09 NOTE — PROGRESS NOTES
[facility]      Chief Complaint:   Chief Complaint   Patient presents with    Shoulder Pain     Right        History of Present Illness: Ricky Yeboah is a 74 y.o. male referred by Dr. Brian Cornejo for neck pain.      Neck pain  Onset: about a year ago, no specific inciting event and pain worsened about 6 months ago.   Location: bilateral neck and bilateral shuolders  Radiation: into back of head and into bilateral arms down to hands (whole hand)  Timing: intermittent  Quality: Tight and Numb  Exacerbating Factors: nothing in particular  Alleviating Factors: nothing and medications    Back pain has been present for many years. Pain started when he was running track and doing hurdles in high school. Pain is localized to the right SIJ area with occasional radiation down the right posterior leg to the calf at times. The pain is aggravated by mowing grass in riding lawnmower. The pain is alleviated by heel lift in the right shoe, naproxen. He denies weakness or numbness in the legs. Denies changes in bowel or bladder function. Denies saddle anesthesia. Denies recent fevers or infections. Denies unexplained weight loss    Associated Symptoms: He gets numbness in both arms and hands, primarily in the left. He felt weak in both arms, primarily the left. He denies night fever/night sweats, urinary incontinence/change in function, bowel incontinence/change in function, and unexplained weight loss    He saw a chiropractor which helped.     Severity: Currently: 4/10   Typical Range: 4-5/10     Exacerbation: 7/10     P = 7  E = 4  G = 3  Baseline PEG Score = 4.67    Interval History (07/19/2023):  Ricky Yeboah returns today for follow up.  At the last clinic visit, referred to PT.    Currently, the neck and back pain is improved.  He has even been working more around his yard and is still doing really well. PT helped him. He continues to take the gabapentin which also seems to help.       Current Pain  Scales:  Current: 0/10              Typical Range: 0-2/10      Interval History (10/09/2024):  Ricky Yeboah returns today for follow up.  At the last clinic visit, Advised him to call if pain worsens consider subacromial bursa CSI    Currently, the right shoulder pain is worse.  Pain is primarily localized at the right lateral shoulder anteriorly and posteriorly and radiates into the right upper trapezius and down the right arm to the right elbow.  Pain worse with overhead activity.  Pain is also worse with certain head movements.  He does feel weaker at times in the right arm.  He denies any new numbness.  Denies any changes in bowel or bladder function.  He denies any recent fevers or infections.      Current Pain Scales:  Current: 6/10              Typical Range: 3-10/10   Current PEG Score: 6.67    Opioid Risk Score         Value Time User    Opioid Risk Score  0 4/20/2023  2:13 PM Dena Tom MD             Previous Interventions:  -     Previous Therapies:  PT/OT: yes   Chiropractor: Yes  Relevant Surgery: no   Previous Medications:   - NSAIDS: naproxen and tylenol help.   - Muscle Relaxants:    - TCAs:   - SNRIs:   - Topicals:   - Anticonvulsants:    - Opioids:     Current Pain Medications:  Tylenol OTC      Blood Thinners: None    Full Medication List:    Current Outpatient Medications:     dapagliflozin propanediol (FARXIGA) 10 mg tablet, Take 10 mg by mouth once daily., Disp: , Rfl:     OZEMPIC 0.25 mg or 0.5 mg (2 mg/3 mL) pen injector, INJECT 0.5 MG SUB-Q ONCE A WEEK, Disp: , Rfl:     rosuvastatin (CRESTOR) 20 MG tablet, Take 20 mg by mouth every evening., Disp: , Rfl:     valsartan (DIOVAN) 160 MG tablet, Take 160 mg by mouth every evening., Disp: , Rfl:     acetaminophen (TYLENOL ARTHRITIS ORAL), Take by mouth. (Patient not taking: Reported on 10/9/2024), Disp: , Rfl:     gabapentin (NEURONTIN) 300 MG capsule, Take 1 capsule (300 mg total) by mouth every evening., Disp: 30 capsule, Rfl: 11    " naproxen sodium (ANAPROX) 220 MG tablet, Take 220 mg by mouth 2 (two) times daily with meals. (Patient not taking: Reported on 10/9/2024), Disp: , Rfl:      Review of Systems:  ROS    Allergies:  Patient has no known allergies.     Medical History:   has a past medical history of DM (diabetes mellitus), HTN (hypertension), and Mixed hyperlipidemia.    Surgical History:   has a past surgical history that includes Vasectomy; Cataract extraction; and Hernia repair.    Family History:  family history includes Heart disease in his father.    Social History:   reports that he has never smoked. He has never used smokeless tobacco. He reports that he does not currently use alcohol.    Physical Exam:  /75   Pulse 69   Ht 5' 11" (1.803 m)   Wt 95.7 kg (211 lb)   SpO2 96%   BMI 29.43 kg/m²   GEN: No acute distress. Calm, comfortable  HENT: Normocephalic, atraumatic, moist mucous membranes  EYE: Anicteric sclera, non-injected.   CV: Non-diaphoretic. Regular Rate. Radial Pulses 2+.  RESP: Breathing comfortably. Chest expansion symmetric.  EXT: No clubbing, cyanosis.   SKIN: Warm, & dry to palpation. No visible rashes or lesions of exposed skin.   PSYCH: Pleasant mood and appropriate affect. Recent and remote memory intact.   GAIT: Independent, normal ambulation  Neck Exam:       Inspection: No erythema, bruising. Forward head and rounded shoulders      Palpation: (+) TTP of bilateral cervical paraspinals, b/l trapezius      ROM:  Limitation in extension, lateral bending b/l or right rotation. Pain with extension and right rotation      Provocative Maneuvers:  (-) Spurling's bilaterally  Shoulder Exam:       Inspection: No erythema, bruising.       Palpation: (+) TTP of b/l proximal biceps tendon and left subacromial area.       ROM:  Limited in abduction, internal rotation b/l      Provocative Maneuvers:  (+) Hawkin's b/l       (+) Empty Can b/l       (+) Speed's on left   (-) Drop arm b/l  Lumbar Spine Exam:       " Inspection: No erythema, bruising. Loss of lordosis      Palpation: (+) TTP of lumbar paraspinals and SIJ on right       ROM:  Limited in flexion, extension, lateral bending.       (+) Facet loading on right      (-) Straight Leg Raise bilaterally      (+) GUS on right  Neurologic Exam:     Alert. Speech is fluent and appropriate.     Strength: 4/5 in left elbow flex/ext and finger flexion, otherwise 5/5 throughout bilateral upper & lower extremities     Sensation:  Grossly intact to light touch in bilateral upper & lower extremities     Reflexes: 2+ in b/l patella, achilles, biceps, brachioradialis, triceps     Tone: No abnormality appreciated in bilateral upper or lower extremities     (-) Gonzáles bilaterally            Imaging:  - MRI C-spine 1/17/23:   The height of the cervical vertebral bodies is maintained. There is slight reversal of the normal lordotic curvature which may be due to muscle spasm or patient positioning. There is some disc space narrowing and loss of disc signal from the C2-3 through C6-7 levels consistent with disc desiccation discogenic change. Just above the C1 level at the junction of the lower medulla and most upper aspect of the cord is a very small 1-2 mm focus of increased signal within the cord. This is of uncertain significance. This could represent a small amount of fluid within the central canal. No axial images through this level were obtained. Some additional images of the upper cord in this region with and without contrast are recommended to exclude a contrast enhancing lesion. The cervical cord is otherwise unremarkable.  C2-3: No disc herniation central canal stenosis or neural canal narrowing is seen. Note is made of some hypertrophic arthropathic changes of the dens but no mass-effect on the cord or central canal compromise at the C1 level is seen.  C3-4: There is a broad-based disc herniation or disc osteophyte complex extending approximately 3 mm beyond the vertebral  "body margin effacing the ventral aspect of the thecal sac but causing no cord deformity or central canal stenosis. There is neural canal narrowing bilaterally due to uncinate facet hypertrophy.  C4-5: There is 1-2 mm of disc bulging and osteophytic ridging. No mass-effect on the cord is seen. No central canal stenosis is seen. There is neural canal narrowing bilaterally left greater than right due to uncinate facet hypertrophy.  C5-6: There is a broad-based disc herniation or disc osteophyte complex eccentric to the right extending approximately 2-3 mm beyond the vertebral body margin abutting or minimally impinging on the right ventral aspect of the cord. No central canal stenosis is seen. There is neural canal narrowing bilaterally due to uncinate facet hypertrophy.  C6-7: There is disc bulging and osteophytic ridging with some mass effect on the thecal sac but no mass-effect on the cord. Disc extends approximately 1-2 mm beyond the vertebral body margin. No central canal stenosis is seen. There is neural canal narrowing bilaterally due to uncinate facet hypertrophy  C7-T1: There is mild neural canal narrowing due to uncinate facet hypertrophy bilaterally. No central canal stenosis is seen. No disc herniation is seen      Labs:  BMP  No results found for: "NA", "K", "CL", "CO2", "BUN", "CREATININE", "CALCIUM", "ANIONGAP", "EGFRNORACEVR"  No results found for: "ALT", "AST", "GGT", "ALKPHOS", "BILITOT"  No results found for: "PLT"    Assessment:  Ricky Yeboah is a 74 y.o. male with the following diagnoses based on history, exam, and imaging:    Problem List Items Addressed This Visit    None  Visit Diagnoses       Impingement of right shoulder    -  Primary    Relevant Orders    Ambulatory referral/consult to Physical/Occupational Therapy    Large Joint Aspiration/Injection: R subacromial bursa    Chronic neck pain        Relevant Medications    gabapentin (NEURONTIN) 300 MG capsule    Other Relevant Orders    " Ambulatory referral/consult to Physical/Occupational Therapy    Cervical radiculopathy        Relevant Medications    gabapentin (NEURONTIN) 300 MG capsule    Other Relevant Orders    Ambulatory referral/consult to Physical/Occupational Therapy    Spondylolisthesis of cervical region        Cervical spondylosis        Chronic pain of both shoulders        Relevant Medications    gabapentin (NEURONTIN) 300 MG capsule    Other Relevant Orders    Large Joint Aspiration/Injection: R subacromial bursa    Impingement syndrome of both shoulders        Relevant Medications    gabapentin (NEURONTIN) 300 MG capsule    Chronic right-sided low back pain with right-sided sciatica        Relevant Medications    gabapentin (NEURONTIN) 300 MG capsule                This is a pleasant 74 y.o. gentleman presenting with:     - Chronic neck pain and bilateral radicular arm pains: Bilateral foraminal stenosis at multiple levels. Diffuse facet arthropathy.   - Pain currently radiating primarily into the right arm.   - Chronic bilateral shoulder pain: Impingement signs present b/l with biceps tendonopathy on the left as well.  - Chronic right low back pain primarily over right SIJ and with (+) facet provocation as well.   - Comorbidities: HTN.    Treatment Plan:   - PT/OT/HEP: Refer back to PT for shoulder and neck pain. Cont HEP learned at PT. Discussed benefits of exercise for pain.   - Procedures: Performed right shoulder subacromial bursa CSI today.    - Consider C7-T1 IL GIANLUCA if pain not improved   - Consider right SIJ CSI vs right L4-5, L5-S1 diagnostic MBBs for back pain  - Medications:    - Cont gabapentin 300 mg qHS for sleep and pain as this is providing good benefit.   - Imaging: Reviewed. None  - Labs: None      Follow Up: RTC in 8 weeks or sooner PRN    Dena Tom M.D.  Interventional Pain Medicine / Physical Medicine & Rehabilitation

## 2024-10-09 NOTE — PROCEDURES
Large Joint Aspiration/Injection: R subacromial bursa    Date/Time: 10/9/2024 11:40 AM    Performed by: Dena Tom MD  Authorized by: Dena Tom MD    Consent Done?:  Yes (Written)  Indications:  Pain  Site marked: the procedure site was marked    Timeout: prior to procedure the correct patient, procedure, and site was verified    Prep: patient was prepped and draped in usual sterile fashion      Details:  Needle Size:  25 G  Ultrasonic Guidance for needle placement?: No    Approach:  Lateral  Location:  Shoulder  Site:  R subacromial bursa  Medications:  40 mg methylPREDNISolone acetate 40 mg/mL; 2 mL LIDOcaine HCL 10 mg/ml (1%) 10 mg/mL (1 %)  Patient tolerance:  Patient tolerated the procedure well with no immediate complications

## 2024-12-11 ENCOUNTER — TELEPHONE (OUTPATIENT)
Dept: PAIN MEDICINE | Facility: CLINIC | Age: 74
End: 2024-12-11

## 2024-12-11 NOTE — TELEPHONE ENCOUNTER
----- Message from Haydee sent at 12/11/2024  3:03 PM CST -----  Contact: MADHU DEGROOT [[id number]]  ...Type:  Patient Rescheduling Appointment     Who Called:MADHU DEGROOT [[id number]]  Date of appointment?:12/11  Why they can't make it: didn't know of appt  Would the patient rather a call back or a response via [facility]? call  Best Call Back Number:.[phone] (home)   Additional Information:

## 2024-12-17 ENCOUNTER — OFFICE VISIT (OUTPATIENT)
Dept: PAIN MEDICINE | Facility: CLINIC | Age: 74
End: 2024-12-17
Payer: MEDICARE

## 2024-12-17 VITALS
SYSTOLIC BLOOD PRESSURE: 124 MMHG | HEART RATE: 74 BPM | DIASTOLIC BLOOD PRESSURE: 83 MMHG | BODY MASS INDEX: 29.54 KG/M2 | HEIGHT: 71 IN | WEIGHT: 211 LBS

## 2024-12-17 DIAGNOSIS — M54.41 CHRONIC RIGHT-SIDED LOW BACK PAIN WITH RIGHT-SIDED SCIATICA: ICD-10-CM

## 2024-12-17 DIAGNOSIS — M54.12 CERVICAL RADICULOPATHY: ICD-10-CM

## 2024-12-17 DIAGNOSIS — G89.29 CHRONIC PAIN OF BOTH SHOULDERS: ICD-10-CM

## 2024-12-17 DIAGNOSIS — M25.512 CHRONIC PAIN OF BOTH SHOULDERS: ICD-10-CM

## 2024-12-17 DIAGNOSIS — M75.41 IMPINGEMENT SYNDROME OF BOTH SHOULDERS: ICD-10-CM

## 2024-12-17 DIAGNOSIS — M54.2 CHRONIC NECK PAIN: ICD-10-CM

## 2024-12-17 DIAGNOSIS — G89.29 CHRONIC RIGHT-SIDED LOW BACK PAIN WITH RIGHT-SIDED SCIATICA: ICD-10-CM

## 2024-12-17 DIAGNOSIS — M25.511 CHRONIC PAIN OF BOTH SHOULDERS: ICD-10-CM

## 2024-12-17 DIAGNOSIS — G89.29 CHRONIC NECK PAIN: ICD-10-CM

## 2024-12-17 DIAGNOSIS — M75.42 IMPINGEMENT SYNDROME OF BOTH SHOULDERS: ICD-10-CM

## 2024-12-17 PROCEDURE — 1125F AMNT PAIN NOTED PAIN PRSNT: CPT | Mod: CPTII,,, | Performed by: PHYSICAL MEDICINE & REHABILITATION

## 2024-12-17 PROCEDURE — 99214 OFFICE O/P EST MOD 30 MIN: CPT | Mod: S$PBB,,, | Performed by: PHYSICAL MEDICINE & REHABILITATION

## 2024-12-17 PROCEDURE — 4010F ACE/ARB THERAPY RXD/TAKEN: CPT | Mod: CPTII,,, | Performed by: PHYSICAL MEDICINE & REHABILITATION

## 2024-12-17 PROCEDURE — 3074F SYST BP LT 130 MM HG: CPT | Mod: CPTII,,, | Performed by: PHYSICAL MEDICINE & REHABILITATION

## 2024-12-17 PROCEDURE — 1159F MED LIST DOCD IN RCRD: CPT | Mod: CPTII,,, | Performed by: PHYSICAL MEDICINE & REHABILITATION

## 2024-12-17 PROCEDURE — 3008F BODY MASS INDEX DOCD: CPT | Mod: CPTII,,, | Performed by: PHYSICAL MEDICINE & REHABILITATION

## 2024-12-17 PROCEDURE — 3079F DIAST BP 80-89 MM HG: CPT | Mod: CPTII,,, | Performed by: PHYSICAL MEDICINE & REHABILITATION

## 2024-12-17 RX ORDER — METFORMIN HYDROCHLORIDE 500 MG/1
TABLET, EXTENDED RELEASE ORAL
COMMUNITY
Start: 2024-12-02

## 2024-12-17 RX ORDER — GABAPENTIN 100 MG/1
100 CAPSULE ORAL NIGHTLY
Qty: 30 CAPSULE | Refills: 11 | Status: SHIPPED | OUTPATIENT
Start: 2024-12-17 | End: 2025-12-12

## 2024-12-17 RX ORDER — CELECOXIB 200 MG/1
200 CAPSULE ORAL EVERY OTHER DAY
COMMUNITY
Start: 2024-11-11

## 2024-12-17 NOTE — PROGRESS NOTES
[facility]      Chief Complaint:   Chief Complaint   Patient presents with    Shoulder Pain     right       History of Present Illness: Ricky Yeboah is a 74 y.o. male referred by Dr. Brian Cornejo for neck pain.      Neck pain  Onset: about a year ago, no specific inciting event and pain worsened about 6 months ago.   Location: bilateral neck and bilateral shuolders  Radiation: into back of head and into bilateral arms down to hands (whole hand)  Timing: intermittent  Quality: Tight and Numb  Exacerbating Factors: nothing in particular  Alleviating Factors: nothing and medications    Back pain has been present for many years. Pain started when he was running track and doing hurdles in high school. Pain is localized to the right SIJ area with occasional radiation down the right posterior leg to the calf at times. The pain is aggravated by mowing grass in riding lawnmower. The pain is alleviated by heel lift in the right shoe, naproxen. He denies weakness or numbness in the legs. Denies changes in bowel or bladder function. Denies saddle anesthesia. Denies recent fevers or infections. Denies unexplained weight loss    Associated Symptoms: He gets numbness in both arms and hands, primarily in the left. He felt weak in both arms, primarily the left. He denies night fever/night sweats, urinary incontinence/change in function, bowel incontinence/change in function, and unexplained weight loss    He saw a chiropractor which helped.     Severity: Currently: 4/10   Typical Range: 4-5/10     Exacerbation: 7/10     P = 7  E = 4  G = 3  Baseline PEG Score = 4.67      Interval History (10/09/2024):  Currently, the right shoulder pain is worse.  Pain is primarily localized at the right lateral shoulder anteriorly and posteriorly and radiates into the right upper trapezius and down the right arm to the right elbow.  Pain worse with overhead activity.  Pain is also worse with certain head movements.  He does  feel weaker at times in the right arm.  He denies any new numbness.  Denies any changes in bowel or bladder function.  He denies any recent fevers or infections.      Interval History (12/17/2024):  Ricky Yeboah returns today for follow up.  At the last clinic visit, referred to physical therapy, performed right shoulder subacromial bursa CSI today, cont gabapentin.     Right subacromial bursa CSI w/ 50% relief.  Physical therapy provided 50% relief. He stopped taking gabapentin due to nightmares.     Currently, the right shoulder, neck and arm pain is stable.  The neck and arm pain is unchanged in character or location. Denies any changes in bowel or bladder function. Denies any new weakness or new numbness since the most recent visit. Denies any fevers or recent infections/antibiotics. Denies any unexplained weight loss.     He cont in PT. He has done about 4 weeks and has 2 more weeks left.       Current Pain Scales:  Current: 4/10              Typical Range: 4-8/10     Current PEG Score: 6.67    Opioid Risk Score         Value Time User    Opioid Risk Score  0 4/20/2023  2:13 PM Dena Tom MD             Previous Interventions:  -     Previous Therapies:  PT/OT: yes   Chiropractor: Yes  Relevant Surgery: no   Previous Medications:   - NSAIDS: naproxen and tylenol help.   - Muscle Relaxants:    - TCAs:   - SNRIs:   - Topicals:   - Anticonvulsants:    - Opioids:     Current Pain Medications:  Tylenol OTC      Blood Thinners: None    Full Medication List:    Current Outpatient Medications:     celecoxib (CELEBREX) 200 MG capsule, Take 200 mg by mouth every other day., Disp: , Rfl:     dapagliflozin propanediol (FARXIGA) 10 mg tablet, Take 10 mg by mouth once daily., Disp: , Rfl:     metFORMIN (GLUCOPHAGE-XR) 500 MG ER 24hr tablet, TAKE 1 TABLET BY MOUTH ONCE DAILY FOR DIABETES, Disp: , Rfl:     OZEMPIC 0.25 mg or 0.5 mg (2 mg/3 mL) pen injector, INJECT 0.5 MG SUB-Q ONCE A WEEK, Disp: , Rfl:      "rosuvastatin (CRESTOR) 20 MG tablet, Take 20 mg by mouth every evening., Disp: , Rfl:     valsartan (DIOVAN) 160 MG tablet, Take 160 mg by mouth every evening., Disp: , Rfl:     gabapentin (NEURONTIN) 100 MG capsule, Take 1 capsule (100 mg total) by mouth every evening., Disp: 30 capsule, Rfl: 11     Review of Systems:  ROS    Allergies:  Patient has no known allergies.     Medical History:   has a past medical history of DM (diabetes mellitus), HTN (hypertension), and Mixed hyperlipidemia.    Surgical History:   has a past surgical history that includes Vasectomy; Cataract extraction; and Hernia repair.    Family History:  family history includes Heart disease in his father.    Social History:   reports that he has never smoked. He has never been exposed to tobacco smoke. He has never used smokeless tobacco. He reports that he does not currently use alcohol.    Physical Exam:  /83   Pulse 74   Ht 5' 11" (1.803 m)   Wt 95.7 kg (210 lb 15.7 oz)   BMI 29.43 kg/m²   GEN: No acute distress. Calm, comfortable  HENT: Normocephalic, atraumatic, moist mucous membranes  EYE: Anicteric sclera, non-injected.   CV: Non-diaphoretic. Regular Rate. Radial Pulses 2+.  RESP: Breathing comfortably. Chest expansion symmetric.  EXT: No clubbing, cyanosis.   SKIN: Warm, & dry to palpation. No visible rashes or lesions of exposed skin.   PSYCH: Pleasant mood and appropriate affect. Recent and remote memory intact.   GAIT: Independent, normal ambulation  Neck Exam:       Inspection: No erythema, bruising. Forward head and rounded shoulders      Palpation: (+) TTP of bilateral cervical paraspinals, b/l trapezius      ROM:  Limitation in extension, lateral bending b/l or right rotation. Pain with extension and right rotation      Provocative Maneuvers:  (-) Spurling's bilaterally  Shoulder Exam:       Inspection: No erythema, bruising.       Palpation: (+) TTP of b/l proximal biceps tendon and left subacromial area.       ROM:  " Limited in abduction, internal rotation b/l      Provocative Maneuvers:  (+) Hawkin's b/l       (+) Empty Can b/l       (+) Speed's on left   (-) Drop arm b/l  Lumbar Spine Exam:       Inspection: No erythema, bruising. Loss of lordosis      Palpation: (+) TTP of lumbar paraspinals and SIJ on right       ROM:  Limited in flexion, extension, lateral bending.       (+) Facet loading on right      (-) Straight Leg Raise bilaterally      (+) GUS on right  Neurologic Exam:     Alert. Speech is fluent and appropriate.     Strength: 4/5 in left elbow flex/ext and finger flexion, otherwise 5/5 throughout bilateral upper & lower extremities     Sensation:  Grossly intact to light touch in bilateral upper & lower extremities     Reflexes: 2+ in b/l patella, achilles, biceps, brachioradialis, triceps     Tone: No abnormality appreciated in bilateral upper or lower extremities     (-) Gonzáles bilaterally            Imaging:  - MRI C-spine 1/17/23:   The height of the cervical vertebral bodies is maintained. There is slight reversal of the normal lordotic curvature which may be due to muscle spasm or patient positioning. There is some disc space narrowing and loss of disc signal from the C2-3 through C6-7 levels consistent with disc desiccation discogenic change. Just above the C1 level at the junction of the lower medulla and most upper aspect of the cord is a very small 1-2 mm focus of increased signal within the cord. This is of uncertain significance. This could represent a small amount of fluid within the central canal. No axial images through this level were obtained. Some additional images of the upper cord in this region with and without contrast are recommended to exclude a contrast enhancing lesion. The cervical cord is otherwise unremarkable.  C2-3: No disc herniation central canal stenosis or neural canal narrowing is seen. Note is made of some hypertrophic arthropathic changes of the dens but no mass-effect on the  "cord or central canal compromise at the C1 level is seen.  C3-4: There is a broad-based disc herniation or disc osteophyte complex extending approximately 3 mm beyond the vertebral body margin effacing the ventral aspect of the thecal sac but causing no cord deformity or central canal stenosis. There is neural canal narrowing bilaterally due to uncinate facet hypertrophy.  C4-5: There is 1-2 mm of disc bulging and osteophytic ridging. No mass-effect on the cord is seen. No central canal stenosis is seen. There is neural canal narrowing bilaterally left greater than right due to uncinate facet hypertrophy.  C5-6: There is a broad-based disc herniation or disc osteophyte complex eccentric to the right extending approximately 2-3 mm beyond the vertebral body margin abutting or minimally impinging on the right ventral aspect of the cord. No central canal stenosis is seen. There is neural canal narrowing bilaterally due to uncinate facet hypertrophy.  C6-7: There is disc bulging and osteophytic ridging with some mass effect on the thecal sac but no mass-effect on the cord. Disc extends approximately 1-2 mm beyond the vertebral body margin. No central canal stenosis is seen. There is neural canal narrowing bilaterally due to uncinate facet hypertrophy  C7-T1: There is mild neural canal narrowing due to uncinate facet hypertrophy bilaterally. No central canal stenosis is seen. No disc herniation is seen      Labs:  BMP  No results found for: "NA", "K", "CL", "CO2", "BUN", "CREATININE", "CALCIUM", "ANIONGAP", "EGFRNORACEVR"  No results found for: "ALT", "AST", "GGT", "ALKPHOS", "BILITOT"  No results found for: "PLT"    Assessment:  Ricky Yeboah is a 74 y.o. male with the following diagnoses based on history, exam, and imaging:    Problem List Items Addressed This Visit    None  Visit Diagnoses       Chronic neck pain        Relevant Medications    gabapentin (NEURONTIN) 100 MG capsule    Cervical radiculopathy        " Relevant Medications    gabapentin (NEURONTIN) 100 MG capsule    Chronic pain of both shoulders        Relevant Medications    gabapentin (NEURONTIN) 100 MG capsule    Impingement syndrome of both shoulders        Relevant Medications    gabapentin (NEURONTIN) 100 MG capsule    Chronic right-sided low back pain with right-sided sciatica        Relevant Medications    gabapentin (NEURONTIN) 100 MG capsule                  This is a pleasant 74 y.o. gentleman presenting with:     - Chronic neck pain and bilateral radicular arm pains: Bilateral foraminal stenosis at multiple levels. Diffuse facet arthropathy.   - Pain currently radiating primarily into the right arm.   - Chronic bilateral shoulder pain: Impingement signs present b/l with biceps tendonopathy on the left as well.  - Chronic right low back pain primarily over right SIJ and with (+) facet provocation as well.   - Comorbidities: HTN.    Treatment Plan:   - PT/OT/HEP: Cont PT for shoulder and neck pain.   - Cont HEP learned at PT.   - Discussed benefits of exercise for pain. Rec'ed pilates.   - Procedures: Consider C7-T1 IL GIANLUCA if pain not improved w/ conservative measures   - Consider right SIJ CSI vs right L4-5, L5-S1 diagnostic MBBs for back pain  - Medications:    - Decrease gabapentin to 100 mg qHS for sleep and pain (see if lower dose better tolerated and DC if not better toelrated)  - Imaging: Reviewed. Consider MRI right shoulder if pain persists despite PT.   - Labs: None      Follow Up: RTC in 2-3 weeks or sooner ASHLEY Tom M.D.  Interventional Pain Medicine / Physical Medicine & Rehabilitation

## 2025-01-07 ENCOUNTER — OFFICE VISIT (OUTPATIENT)
Dept: PAIN MEDICINE | Facility: CLINIC | Age: 75
End: 2025-01-07
Payer: MEDICARE

## 2025-01-07 VITALS
SYSTOLIC BLOOD PRESSURE: 135 MMHG | HEART RATE: 80 BPM | HEIGHT: 71 IN | DIASTOLIC BLOOD PRESSURE: 84 MMHG | BODY MASS INDEX: 29.4 KG/M2 | OXYGEN SATURATION: 97 % | WEIGHT: 210 LBS

## 2025-01-07 DIAGNOSIS — G89.29 CHRONIC PAIN OF BOTH SHOULDERS: ICD-10-CM

## 2025-01-07 DIAGNOSIS — M75.42 IMPINGEMENT SYNDROME OF BOTH SHOULDERS: ICD-10-CM

## 2025-01-07 DIAGNOSIS — M43.12 SPONDYLOLISTHESIS OF CERVICAL REGION: ICD-10-CM

## 2025-01-07 DIAGNOSIS — M47.812 CERVICAL SPONDYLOSIS: ICD-10-CM

## 2025-01-07 DIAGNOSIS — M54.41 CHRONIC RIGHT-SIDED LOW BACK PAIN WITH RIGHT-SIDED SCIATICA: ICD-10-CM

## 2025-01-07 DIAGNOSIS — G89.29 CHRONIC NECK PAIN: Primary | ICD-10-CM

## 2025-01-07 DIAGNOSIS — G89.29 CHRONIC RIGHT-SIDED LOW BACK PAIN WITH RIGHT-SIDED SCIATICA: ICD-10-CM

## 2025-01-07 DIAGNOSIS — M54.2 CHRONIC NECK PAIN: Primary | ICD-10-CM

## 2025-01-07 DIAGNOSIS — M25.512 CHRONIC PAIN OF BOTH SHOULDERS: ICD-10-CM

## 2025-01-07 DIAGNOSIS — M54.12 CERVICAL RADICULOPATHY: ICD-10-CM

## 2025-01-07 DIAGNOSIS — M25.511 CHRONIC PAIN OF BOTH SHOULDERS: ICD-10-CM

## 2025-01-07 DIAGNOSIS — M75.41 IMPINGEMENT SYNDROME OF BOTH SHOULDERS: ICD-10-CM

## 2025-01-07 PROCEDURE — 1125F AMNT PAIN NOTED PAIN PRSNT: CPT | Mod: CPTII,,, | Performed by: PHYSICIAN ASSISTANT

## 2025-01-07 PROCEDURE — 3008F BODY MASS INDEX DOCD: CPT | Mod: CPTII,,, | Performed by: PHYSICIAN ASSISTANT

## 2025-01-07 PROCEDURE — 1159F MED LIST DOCD IN RCRD: CPT | Mod: CPTII,,, | Performed by: PHYSICIAN ASSISTANT

## 2025-01-07 PROCEDURE — 3075F SYST BP GE 130 - 139MM HG: CPT | Mod: CPTII,,, | Performed by: PHYSICIAN ASSISTANT

## 2025-01-07 PROCEDURE — 99213 OFFICE O/P EST LOW 20 MIN: CPT | Mod: S$PBB,,, | Performed by: PHYSICIAN ASSISTANT

## 2025-01-07 PROCEDURE — 3079F DIAST BP 80-89 MM HG: CPT | Mod: CPTII,,, | Performed by: PHYSICIAN ASSISTANT

## 2025-01-07 NOTE — PROGRESS NOTES
[facility]      Chief Complaint:   Chief Complaint   Patient presents with    Shoulder Pain       History of Present Illness: Ricky Yeboah is a 74 y.o. male referred by Dr. Brian Cornejo for neck pain.      Neck pain  Onset: about a year ago, no specific inciting event and pain worsened about 6 months ago.   Location: bilateral neck and bilateral shuolders  Radiation: into back of head and into bilateral arms down to hands (whole hand)  Timing: intermittent  Quality: Tight and Numb  Exacerbating Factors: nothing in particular  Alleviating Factors: nothing and medications    Back pain has been present for many years. Pain started when he was running track and doing hurdles in high school. Pain is localized to the right SIJ area with occasional radiation down the right posterior leg to the calf at times. The pain is aggravated by mowing grass in riding lawnmower. The pain is alleviated by heel lift in the right shoe, naproxen. He denies weakness or numbness in the legs. Denies changes in bowel or bladder function. Denies saddle anesthesia. Denies recent fevers or infections. Denies unexplained weight loss    Associated Symptoms: He gets numbness in both arms and hands, primarily in the left. He felt weak in both arms, primarily the left. He denies night fever/night sweats, urinary incontinence/change in function, bowel incontinence/change in function, and unexplained weight loss    He saw a chiropractor which helped.     Severity: Currently: 4/10   Typical Range: 4-5/10     Exacerbation: 7/10     P = 7  E = 4  G = 3  Baseline PEG Score = 4.67      Interval History (10/09/2024):  Currently, the right shoulder pain is worse.  Pain is primarily localized at the right lateral shoulder anteriorly and posteriorly and radiates into the right upper trapezius and down the right arm to the right elbow.  Pain worse with overhead activity.  Pain is also worse with certain head movements.  He does feel weaker  at times in the right arm.  He denies any new numbness.  Denies any changes in bowel or bladder function.  He denies any recent fevers or infections.      Interval History (12/17/2024):  Ricky Yeboah returns today for follow up.  At the last clinic visit, referred to physical therapy, performed right shoulder subacromial bursa CSI today, cont gabapentin.     Right subacromial bursa CSI w/ 50% relief.  Physical therapy provided 50% relief. He stopped taking gabapentin due to nightmares.     Currently, the right shoulder, neck and arm pain is stable.  The neck and arm pain is unchanged in character or location. Denies any changes in bowel or bladder function. Denies any new weakness or new numbness since the most recent visit. Denies any fevers or recent infections/antibiotics. Denies any unexplained weight loss.     He cont in PT. He has done about 4 weeks and has 2 more weeks left.       Current Pain Scales:  Current: 4/10              Typical Range: 4-8/10     Interval History (01/07/2025):  Ricky Yeboah returns today for follow up.  At the last clinic visit, decreased gabapentin to 100 mg qHS for sleep and pain. Cont HEP learned at PT.     Currently, the right shoulder pain is improved.  He has completed PT for neck and shoulder and continuing HEP.Denies any changes in bowel or bladder function. Denies any new weakness or new numbness since the most recent visit. Denies any fevers or recent infections/antibiotics.    Currently, the lower back improved, not currently bothering him.     Since prior visit, he has decreased to Gabapentin 100mg qHS, Denies side effects with this dose. States this is helping.     Current Pain Scales:  Current: 1/10              Typical Range: 0-6/10     Current PEG Score: 1    Opioid Risk Score         Value Time User    Opioid Risk Score  0 4/20/2023  2:13 PM Dena Tom MD             Previous Interventions:  - 10/9/24: Right shoulder subacromial bursa CSI w/ 50%  "relief     Previous Therapies:  PT/OT: yes   Chiropractor: Yes  Relevant Surgery: no   Previous Medications:   - NSAIDS: naproxen and tylenol help.   - Muscle Relaxants:    - TCAs:   - SNRIs:   - Topicals:   - Anticonvulsants:    - Opioids:     Current Pain Medications:  Tylenol OTC      Blood Thinners: None    Full Medication List:    Current Outpatient Medications:     celecoxib (CELEBREX) 200 MG capsule, Take 200 mg by mouth every other day., Disp: , Rfl:     dapagliflozin propanediol (FARXIGA) 10 mg tablet, Take 10 mg by mouth once daily., Disp: , Rfl:     gabapentin (NEURONTIN) 100 MG capsule, Take 1 capsule (100 mg total) by mouth every evening., Disp: 30 capsule, Rfl: 11    metFORMIN (GLUCOPHAGE-XR) 500 MG ER 24hr tablet, TAKE 1 TABLET BY MOUTH ONCE DAILY FOR DIABETES, Disp: , Rfl:     OZEMPIC 0.25 mg or 0.5 mg (2 mg/3 mL) pen injector, INJECT 0.5 MG SUB-Q ONCE A WEEK, Disp: , Rfl:     rosuvastatin (CRESTOR) 20 MG tablet, Take 20 mg by mouth every evening., Disp: , Rfl:     valsartan (DIOVAN) 160 MG tablet, Take 160 mg by mouth every evening., Disp: , Rfl:      Review of Systems:  ROS    Allergies:  Patient has no known allergies.     Medical History:   has a past medical history of DM (diabetes mellitus), HTN (hypertension), and Mixed hyperlipidemia.    Surgical History:   has a past surgical history that includes Vasectomy; Cataract extraction; and Hernia repair.    Family History:  family history includes Heart disease in his father.    Social History:   reports that he has never smoked. He has never been exposed to tobacco smoke. He has never used smokeless tobacco. He reports that he does not currently use alcohol.    Physical Exam:  /84 (BP Location: Left arm, Patient Position: Sitting)   Pulse 80   Ht 5' 11" (1.803 m)   Wt 95.3 kg (210 lb)   SpO2 97%   BMI 29.29 kg/m²   GEN: No acute distress. Calm, comfortable  HENT: Normocephalic, atraumatic, moist mucous membranes  EYE: Anicteric sclera, " non-injected.   CV: Non-diaphoretic. Regular Rate. Radial Pulses 2+.  RESP: Breathing comfortably. Chest expansion symmetric.  EXT: No clubbing, cyanosis.   SKIN: Warm, & dry to palpation. No visible rashes or lesions of exposed skin.   PSYCH: Pleasant mood and appropriate affect. Recent and remote memory intact.   GAIT: Independent, normal ambulation  Neck Exam:       Inspection: No erythema, bruising. Forward head and rounded shoulders      Palpation: (+) TTP of bilateral cervical paraspinals, b/l trapezius      ROM:  Limitation in extension, lateral bending b/l or right rotation. Pain with extension and right rotation      Provocative Maneuvers:  (-) Spurling's bilaterally  Shoulder Exam:       Inspection: No erythema, bruising.       Palpation: (+) TTP of b/l proximal biceps tendon and left subacromial area.       ROM:  Limited in abduction, internal rotation b/l      Provocative Maneuvers:  (+) Hawkin's b/l       (+) Empty Can b/l       (+) Speed's on left   (-) Drop arm b/l  Lumbar Spine Exam:       Inspection: No erythema, bruising. Loss of lordosis      Palpation: (+) TTP of lumbar paraspinals and SIJ on right       ROM:  Limited in flexion, extension, lateral bending.       (+) Facet loading on right      (-) Straight Leg Raise bilaterally      (+) GUS on right  Neurologic Exam:     Alert. Speech is fluent and appropriate.     Strength: 4/5 in left elbow flex/ext and finger flexion, otherwise 5/5 throughout bilateral upper & lower extremities     Sensation:  Grossly intact to light touch in bilateral upper & lower extremities     Reflexes: 2+ in b/l patella, achilles, biceps, brachioradialis, triceps     Tone: No abnormality appreciated in bilateral upper or lower extremities     (-) Gonzáles bilaterally            Imaging:  - X-ray Lumbar spine 4/26/23:  The vertebral bodies demonstrate a normal height. There is moderate disc space narrowing seen at the L1-2 through the L3-4 levels with more moderate to  severe disc space narrowing seen at the L4-5 and L5-S1 levels. Prominent bilateral facet arthropathy noted the L5-S1 level. Vascular calcification seen involving the aorta.     - X-ray Cervical spine 4/26/23:  The vertebral bodies demonstrate a normal height.  No subluxation seen on the flexion or extension views.  There is some straightening of the lower cervical spine.  Mild-to-moderate disc space narrowing and minimal spondylosis present at the C4-5 through C6-7 levels.  Multilevel facet arthropathy and greatest within the are upper cervical spine on the right.     - X-ray Bilateral shoulders 4/26/23:  Mild-to-moderate AC joint arthropathy seen bilaterally. There is severe degenerative change seen involving either glenohumeral joint with prominent osteophyte formation seen projecting off the glenoid bilaterally..     - MRI C-spine 1/17/23:   The height of the cervical vertebral bodies is maintained. There is slight reversal of the normal lordotic curvature which may be due to muscle spasm or patient positioning. There is some disc space narrowing and loss of disc signal from the C2-3 through C6-7 levels consistent with disc desiccation discogenic change. Just above the C1 level at the junction of the lower medulla and most upper aspect of the cord is a very small 1-2 mm focus of increased signal within the cord. This is of uncertain significance. This could represent a small amount of fluid within the central canal. No axial images through this level were obtained. Some additional images of the upper cord in this region with and without contrast are recommended to exclude a contrast enhancing lesion. The cervical cord is otherwise unremarkable.  C2-3: No disc herniation central canal stenosis or neural canal narrowing is seen. Note is made of some hypertrophic arthropathic changes of the dens but no mass-effect on the cord or central canal compromise at the C1 level is seen.  C3-4: There is a broad-based disc  "herniation or disc osteophyte complex extending approximately 3 mm beyond the vertebral body margin effacing the ventral aspect of the thecal sac but causing no cord deformity or central canal stenosis. There is neural canal narrowing bilaterally due to uncinate facet hypertrophy.  C4-5: There is 1-2 mm of disc bulging and osteophytic ridging. No mass-effect on the cord is seen. No central canal stenosis is seen. There is neural canal narrowing bilaterally left greater than right due to uncinate facet hypertrophy.  C5-6: There is a broad-based disc herniation or disc osteophyte complex eccentric to the right extending approximately 2-3 mm beyond the vertebral body margin abutting or minimally impinging on the right ventral aspect of the cord. No central canal stenosis is seen. There is neural canal narrowing bilaterally due to uncinate facet hypertrophy.  C6-7: There is disc bulging and osteophytic ridging with some mass effect on the thecal sac but no mass-effect on the cord. Disc extends approximately 1-2 mm beyond the vertebral body margin. No central canal stenosis is seen. There is neural canal narrowing bilaterally due to uncinate facet hypertrophy  C7-T1: There is mild neural canal narrowing due to uncinate facet hypertrophy bilaterally. No central canal stenosis is seen. No disc herniation is seen      Labs:  BMP  No results found for: "NA", "K", "CL", "CO2", "BUN", "CREATININE", "CALCIUM", "ANIONGAP", "EGFRNORACEVR"  No results found for: "ALT", "AST", "GGT", "ALKPHOS", "BILITOT"  No results found for: "PLT"    Assessment:  Ricky Yeboah is a 74 y.o. male with the following diagnoses based on history, exam, and imaging:    Problem List Items Addressed This Visit       Chronic neck pain - Primary    Chronic pain of both shoulders    Cervical radiculopathy    Impingement syndrome of both shoulders    Chronic right-sided low back pain with right-sided sciatica     Other Visit Diagnoses       " Spondylolisthesis of cervical region        Cervical spondylosis                        This is a pleasant 74 y.o. gentleman presenting with:     - Chronic neck pain and bilateral radicular arm pains: Bilateral foraminal stenosis at multiple levels. Diffuse facet arthropathy.   - Pain currently radiating primarily into the right arm.    - Currently improved with PT  - Chronic bilateral shoulder pain: Impingement signs present b/l with biceps tendonopathy on the left as well.  - Chronic right low back pain primarily over right SIJ and with (+) facet provocation as well.   - Comorbidities: HTN.    Treatment Plan:   - PT/OT/HEP: Cont HEP learned at PT for neck and shoulder  - Discussed benefits of exercise for pain. Rec'ed pilates.   - Procedures: Consider C7-T1 IL GIANLUCA if pain not improved w/ conservative measures   - Consider right SIJ CSI vs right L4-5, L5-S1 diagnostic MBBs for back pain  - Medications:    - Cont gabapentin to 100 mg qHS for sleep and pain  - Imaging: Reviewed. Consider MRI right shoulder if pain persists despite PT.   - Labs: None      Follow Up: RTC PRN      Keyanna Danielson PA-C  [facility]

## 2025-06-19 ENCOUNTER — OFFICE VISIT (OUTPATIENT)
Dept: PAIN MEDICINE | Facility: CLINIC | Age: 75
End: 2025-06-19
Payer: MEDICARE

## 2025-06-19 VITALS
BODY MASS INDEX: 28.68 KG/M2 | SYSTOLIC BLOOD PRESSURE: 113 MMHG | HEART RATE: 78 BPM | WEIGHT: 204.88 LBS | HEIGHT: 71 IN | OXYGEN SATURATION: 98 % | DIASTOLIC BLOOD PRESSURE: 76 MMHG

## 2025-06-19 DIAGNOSIS — F41.8 SITUATIONAL ANXIETY: ICD-10-CM

## 2025-06-19 DIAGNOSIS — M47.812 CERVICAL SPONDYLOSIS: ICD-10-CM

## 2025-06-19 DIAGNOSIS — M54.2 CHRONIC NECK PAIN: ICD-10-CM

## 2025-06-19 DIAGNOSIS — M54.12 CERVICAL RADICULOPATHY: Primary | ICD-10-CM

## 2025-06-19 DIAGNOSIS — G89.29 CHRONIC NECK PAIN: ICD-10-CM

## 2025-06-19 DIAGNOSIS — M43.12 SPONDYLOLISTHESIS OF CERVICAL REGION: ICD-10-CM

## 2025-06-19 PROCEDURE — 1159F MED LIST DOCD IN RCRD: CPT | Mod: CPTII,,, | Performed by: PHYSICIAN ASSISTANT

## 2025-06-19 PROCEDURE — 1125F AMNT PAIN NOTED PAIN PRSNT: CPT | Mod: CPTII,,, | Performed by: PHYSICIAN ASSISTANT

## 2025-06-19 PROCEDURE — 99214 OFFICE O/P EST MOD 30 MIN: CPT | Mod: S$PBB,,, | Performed by: PHYSICIAN ASSISTANT

## 2025-06-19 PROCEDURE — 4010F ACE/ARB THERAPY RXD/TAKEN: CPT | Mod: CPTII,,, | Performed by: PHYSICIAN ASSISTANT

## 2025-06-19 PROCEDURE — 3078F DIAST BP <80 MM HG: CPT | Mod: CPTII,,, | Performed by: PHYSICIAN ASSISTANT

## 2025-06-19 PROCEDURE — 3074F SYST BP LT 130 MM HG: CPT | Mod: CPTII,,, | Performed by: PHYSICIAN ASSISTANT

## 2025-06-19 PROCEDURE — 3008F BODY MASS INDEX DOCD: CPT | Mod: CPTII,,, | Performed by: PHYSICIAN ASSISTANT

## 2025-06-19 RX ORDER — DIAZEPAM 5 MG/1
5 TABLET ORAL ONCE
Qty: 1 TABLET | Refills: 0 | Status: SHIPPED | OUTPATIENT
Start: 2025-06-19 | End: 2025-06-19

## 2025-06-19 NOTE — PROGRESS NOTES
[facility]      Chief Complaint:   Chief Complaint   Patient presents with    Shoulder Pain    Neck Pain    Arm Pain       History of Present Illness: Ricky Yeboah is a 75 y.o. male referred by Dr. Brian Cornejo for neck pain.      Neck pain  Onset: about a year ago, no specific inciting event and pain worsened about 6 months ago.   Location: bilateral neck and bilateral shuolders  Radiation: into back of head and into bilateral arms down to hands (whole hand)  Timing: intermittent  Quality: Tight and Numb  Exacerbating Factors: nothing in particular  Alleviating Factors: nothing and medications    Back pain has been present for many years. Pain started when he was running track and doing hurdles in high school. Pain is localized to the right SIJ area with occasional radiation down the right posterior leg to the calf at times. The pain is aggravated by mowing grass in riding lawnmower. The pain is alleviated by heel lift in the right shoe, naproxen. He denies weakness or numbness in the legs. Denies changes in bowel or bladder function. Denies saddle anesthesia. Denies recent fevers or infections. Denies unexplained weight loss    Associated Symptoms: He gets numbness in both arms and hands, primarily in the left. He felt weak in both arms, primarily the left. He denies night fever/night sweats, urinary incontinence/change in function, bowel incontinence/change in function, and unexplained weight loss    He saw a chiropractor which helped.     Severity: Currently: 4/10   Typical Range: 4-5/10     Exacerbation: 7/10     P = 7  E = 4  G = 3  Baseline PEG Score = 4.67    Opioid Risk Score         Value Time User    Opioid Risk Score  0 4/20/2023  2:13 PM Dena Tom MD              Previous Interventions:  - 10/9/24: Right shoulder subacromial bursa CSI w/ 50% relief     Previous Therapies:  PT/OT: yes   Chiropractor: Yes  Relevant Surgery: no   Previous Medications:   - NSAIDS: naproxen and  tylenol help.   - Muscle Relaxants:    - TCAs:   - SNRIs:   - Topicals:   - Anticonvulsants:    - Opioids:     Interval History (10/09/2024):  Currently, the right shoulder pain is worse.  Pain is primarily localized at the right lateral shoulder anteriorly and posteriorly and radiates into the right upper trapezius and down the right arm to the right elbow.  Pain worse with overhead activity.  Pain is also worse with certain head movements.  He does feel weaker at times in the right arm.  He denies any new numbness.  Denies any changes in bowel or bladder function.  He denies any recent fevers or infections.    Interval History (01/07/2025):  Ricky Yeboah returns today for follow up.  At the last clinic visit, decreased gabapentin to 100 mg qHS for sleep and pain. Cont HEP learned at PT.     Currently, the right shoulder pain is improved.  He has completed PT for neck and shoulder and continuing HEP.Denies any changes in bowel or bladder function. Denies any new weakness or new numbness since the most recent visit. Denies any fevers or recent infections/antibiotics.    Currently, the lower back improved, not currently bothering him.     Since prior visit, he has decreased to Gabapentin 100mg qHS, Denies side effects with this dose. States this is helping.     Current Pain Scales:  Current: 1/10              Typical Range: 0-6/10   Current PEG Score: 1    Interval History (06/19/2025):  Ricky Yeboah returns today for follow up.  At the last clinic visit, Cont HEP learned at PT for neck and shoulder    Currently, the neck pain is worse. Pain is different than prior. Currently localized to bilateral cervical paraspinal region with radiation into bilateral shoulders and more down right posterior upper arm into forearm. He feels weaker in right UE and has been dropping objects more frequently. Denies changes in gait.. Denies any changes in bowel or bladder function.  Denies any fevers or recent  infections/antibiotics. Denies any unexplained weight loss.     He continues Gabapentin 100mg qHS, which provides relief. Does not take it consistently.    Current Pain Scales:  Current: 7/10              Average: 8/10  Least-Worst: 7-10/10           6/19/2025     9:03 AM   Last 3 PDI Scores   Pain Disability Index (PDI) 61        Current Pain Medications:  Tylenol OTC      Blood Thinners: None    Full Medication List:    Current Outpatient Medications:     celecoxib (CELEBREX) 200 MG capsule, Take 200 mg by mouth every other day., Disp: , Rfl:     dapagliflozin propanediol (FARXIGA) 10 mg tablet, Take 10 mg by mouth once daily., Disp: , Rfl:     gabapentin (NEURONTIN) 100 MG capsule, Take 1 capsule (100 mg total) by mouth every evening., Disp: 30 capsule, Rfl: 11    metFORMIN (GLUCOPHAGE-XR) 500 MG ER 24hr tablet, TAKE 1 TABLET BY MOUTH ONCE DAILY FOR DIABETES, Disp: , Rfl:     OZEMPIC 0.25 mg or 0.5 mg (2 mg/3 mL) pen injector, INJECT 0.5 MG SUB-Q ONCE A WEEK, Disp: , Rfl:     rosuvastatin (CRESTOR) 20 MG tablet, Take 20 mg by mouth every evening., Disp: , Rfl:     valsartan (DIOVAN) 160 MG tablet, Take 160 mg by mouth every evening., Disp: , Rfl:     diazePAM (VALIUM) 5 MG tablet, Take 1 tablet (5 mg total) by mouth once. 45 minutes to 1 hour prior to MRI for procedural anxiety for 1 dose, Disp: 1 tablet, Rfl: 0     Review of Systems:  ROS    Allergies:  Patient has no known allergies.     Medical History:   has a past medical history of DM (diabetes mellitus), HTN (hypertension), and Mixed hyperlipidemia.    Surgical History:   has a past surgical history that includes Vasectomy; Cataract extraction; and Hernia repair.    Family History:  family history includes Heart disease in his father.    Social History:   reports that he has never smoked. He has never been exposed to tobacco smoke. He has never used smokeless tobacco. He reports that he does not currently use alcohol.    Physical Exam:  /76 (Patient  "Position: Sitting)   Pulse 78   Ht 5' 11" (1.803 m)   Wt 92.9 kg (204 lb 14.4 oz)   SpO2 98%   BMI 28.58 kg/m²   GEN: No acute distress. Calm, comfortable  HENT: Normocephalic, atraumatic, moist mucous membranes  EYE: Anicteric sclera, non-injected.   CV: Non-diaphoretic. Regular Rate. Radial Pulses 2+.  RESP: Breathing comfortably. Chest expansion symmetric.  EXT: No clubbing, cyanosis.   SKIN: Warm, & dry to palpation. No visible rashes or lesions of exposed skin.   PSYCH: Pleasant mood and appropriate affect. Recent and remote memory intact.   GAIT: Independent, normal ambulation  Neck Exam:       Inspection: No erythema, bruising. Forward head and rounded shoulders      Palpation: (+) TTP of bilateral cervical paraspinals, b/l trapezius      ROM:  Limitation in extension, lateral bending b/l or right rotation. Pain with extension and right rotation      Provocative Maneuvers:  (-) Spurling's bilaterally  Shoulder Exam:       Inspection: No erythema, bruising.       Palpation: (+) TTP of right subacromial area      ROM:  Limited in abduction, internal rotation b/l      Provocative Maneuvers:  (+) Hawkin's b/l       (+) Empty Can b/l       (+) Speed's on left   (-) Drop arm b/l  Lumbar Spine Exam:       Inspection: No erythema, bruising. Loss of lordosis      Palpation: (+) TTP of lumbar paraspinals and SIJ on right       ROM:  Limited in flexion, extension, lateral bending.       (+) Facet loading on right      (-) Straight Leg Raise bilaterally      (+) GUS on right  Neurologic Exam:     Alert. Speech is fluent and appropriate.     Strength: 4/5 in right elbow flex/ext and finger flexion, otherwise 5/5 throughout bilateral upper & lower extremities     Sensation:  Grossly intact to light touch in bilateral upper & lower extremities     Reflexes: 2+ in b/l patella, achilles, biceps, brachioradialis, triceps     Tone: No abnormality appreciated in bilateral upper or lower extremities     (-) Gonzáles " bilaterally            Imaging:  - X-ray Lumbar spine 4/26/23:  The vertebral bodies demonstrate a normal height. There is moderate disc space narrowing seen at the L1-2 through the L3-4 levels with more moderate to severe disc space narrowing seen at the L4-5 and L5-S1 levels. Prominent bilateral facet arthropathy noted the L5-S1 level. Vascular calcification seen involving the aorta.     - X-ray Cervical spine 4/26/23:  The vertebral bodies demonstrate a normal height.  No subluxation seen on the flexion or extension views.  There is some straightening of the lower cervical spine.  Mild-to-moderate disc space narrowing and minimal spondylosis present at the C4-5 through C6-7 levels.  Multilevel facet arthropathy and greatest within the are upper cervical spine on the right.     - X-ray Bilateral shoulders 4/26/23:  Mild-to-moderate AC joint arthropathy seen bilaterally. There is severe degenerative change seen involving either glenohumeral joint with prominent osteophyte formation seen projecting off the glenoid bilaterally..     - MRI C-spine 1/17/23:   The height of the cervical vertebral bodies is maintained. There is slight reversal of the normal lordotic curvature which may be due to muscle spasm or patient positioning. There is some disc space narrowing and loss of disc signal from the C2-3 through C6-7 levels consistent with disc desiccation discogenic change. Just above the C1 level at the junction of the lower medulla and most upper aspect of the cord is a very small 1-2 mm focus of increased signal within the cord. This is of uncertain significance. This could represent a small amount of fluid within the central canal. No axial images through this level were obtained. Some additional images of the upper cord in this region with and without contrast are recommended to exclude a contrast enhancing lesion. The cervical cord is otherwise unremarkable.  C2-3: No disc herniation central canal stenosis or  "neural canal narrowing is seen. Note is made of some hypertrophic arthropathic changes of the dens but no mass-effect on the cord or central canal compromise at the C1 level is seen.  C3-4: There is a broad-based disc herniation or disc osteophyte complex extending approximately 3 mm beyond the vertebral body margin effacing the ventral aspect of the thecal sac but causing no cord deformity or central canal stenosis. There is neural canal narrowing bilaterally due to uncinate facet hypertrophy.  C4-5: There is 1-2 mm of disc bulging and osteophytic ridging. No mass-effect on the cord is seen. No central canal stenosis is seen. There is neural canal narrowing bilaterally left greater than right due to uncinate facet hypertrophy.  C5-6: There is a broad-based disc herniation or disc osteophyte complex eccentric to the right extending approximately 2-3 mm beyond the vertebral body margin abutting or minimally impinging on the right ventral aspect of the cord. No central canal stenosis is seen. There is neural canal narrowing bilaterally due to uncinate facet hypertrophy.  C6-7: There is disc bulging and osteophytic ridging with some mass effect on the thecal sac but no mass-effect on the cord. Disc extends approximately 1-2 mm beyond the vertebral body margin. No central canal stenosis is seen. There is neural canal narrowing bilaterally due to uncinate facet hypertrophy  C7-T1: There is mild neural canal narrowing due to uncinate facet hypertrophy bilaterally. No central canal stenosis is seen. No disc herniation is seen      Labs:  BMP  No results found for: "NA", "K", "CL", "CO2", "BUN", "CREATININE", "CALCIUM", "ANIONGAP", "EGFRNORACEVR"  No results found for: "ALT", "AST", "GGT", "ALKPHOS", "BILITOT"  No results found for: "PLT"    Assessment:  Ricky Yeboah is a 75 y.o. male with the following diagnoses based on history, exam, and imagin. Cervical radiculopathy  -     MRI Cervical Spine Without " Contrast; Future; Expected date: 06/19/2025    2. Spondylolisthesis of cervical region  -     MRI Cervical Spine Without Contrast; Future; Expected date: 06/19/2025    3. Situational anxiety  -     diazePAM (VALIUM) 5 MG tablet; Take 1 tablet (5 mg total) by mouth once. 45 minutes to 1 hour prior to MRI for procedural anxiety for 1 dose  Dispense: 1 tablet; Refill: 0    4. Chronic neck pain    5. Cervical spondylosis        This is a pleasant 75 y.o. gentleman presenting with:     - Chronic neck pain and bilateral radicular arm pains: Bilateral foraminal stenosis at multiple levels. Diffuse facet arthropathy.   - Pain currently radiating primarily into the right arm.    - Previously improved with PT, but worsened in the past 3 months despite continued HEP.  - Chronic bilateral shoulder pain: Impingement signs present b/l with biceps tendonopathy on the left as well.  - Chronic right low back pain primarily over right SIJ and with (+) facet provocation as well.   - Comorbidities: HTN.    Treatment Plan:   - PT/OT/HEP: Cont HEP learned at PT for neck and shoulder  - Discussed benefits of exercise for pain. Rec'ed pilates.   - Procedures: Plan for C7-T1 IL GIANLUCA, pending review of updated MRI   - Consider right SIJ CSI vs right L4-5, L5-S1 diagnostic MBBs for back pain  - Medications: Rx Valium 5mg #1 prior to MRI.    - Cont gabapentin to 100 mg qHS for sleep and pain  - Imaging: Reviewed. Ordered cervical MRI given change in pain and limited continued relief with HEP/PT  - Consider MRI right shoulder if pain persists despite PT.   - Labs: None      Follow Up: RTC after MRI or sooner PRN.       Keyanna Danielson PA-C  Interventional Pain Medicine

## 2025-06-24 ENCOUNTER — TELEPHONE (OUTPATIENT)
Dept: PAIN MEDICINE | Facility: CLINIC | Age: 75
End: 2025-06-24
Payer: MEDICARE

## 2025-06-24 NOTE — TELEPHONE ENCOUNTER
----- Message from Jillian sent at 6/24/2025  8:08 AM CDT -----  Regarding: PA  MRI Approved Auth# [id number] for dates 06/20 to 07/20/2025

## 2025-07-08 ENCOUNTER — TELEPHONE (OUTPATIENT)
Dept: PAIN MEDICINE | Facility: CLINIC | Age: 75
End: 2025-07-08
Payer: MEDICARE

## 2025-07-08 NOTE — TELEPHONE ENCOUNTER
Copied from CRM #[id number]. Topic: Appointments - Appointment Rescheduling  >> Jul 8, 2025  2:26 PM Luciano wrote:  Type: Appointment Request    Name of Caller:Ricky Yeboah  When is the first available appointment?07/10  Symptoms:  Would the patient rather a call back or a response via [facility]? Call back  Best Call Back Number:[phone]  Additional Information: pt stating his MRI isn't scheduled until end of July, needing to know if he still needs to come to his appt or not

## 2025-07-10 NOTE — TELEPHONE ENCOUNTER
MRI auth extented, new dates: 7/29/25-8/28/25.     Called pt to inform, no answer. Left vm so that he is aware.

## 2025-08-28 ENCOUNTER — OFFICE VISIT (OUTPATIENT)
Dept: PAIN MEDICINE | Facility: CLINIC | Age: 75
End: 2025-08-28
Payer: MEDICARE

## 2025-08-28 VITALS
OXYGEN SATURATION: 97 % | SYSTOLIC BLOOD PRESSURE: 116 MMHG | BODY MASS INDEX: 28.56 KG/M2 | HEIGHT: 71 IN | WEIGHT: 204 LBS | DIASTOLIC BLOOD PRESSURE: 74 MMHG | HEART RATE: 74 BPM

## 2025-08-28 DIAGNOSIS — M25.561 CHRONIC PAIN OF RIGHT KNEE: Primary | ICD-10-CM

## 2025-08-28 DIAGNOSIS — M75.41 IMPINGEMENT SYNDROME OF BOTH SHOULDERS: ICD-10-CM

## 2025-08-28 DIAGNOSIS — M43.12 SPONDYLOLISTHESIS OF CERVICAL REGION: ICD-10-CM

## 2025-08-28 DIAGNOSIS — M54.12 CERVICAL RADICULOPATHY: Primary | ICD-10-CM

## 2025-08-28 DIAGNOSIS — G89.29 CHRONIC PAIN OF RIGHT KNEE: Primary | ICD-10-CM

## 2025-08-28 DIAGNOSIS — M54.2 CHRONIC NECK PAIN: ICD-10-CM

## 2025-08-28 DIAGNOSIS — M25.511 CHRONIC PAIN OF BOTH SHOULDERS: ICD-10-CM

## 2025-08-28 DIAGNOSIS — M75.42 IMPINGEMENT SYNDROME OF BOTH SHOULDERS: ICD-10-CM

## 2025-08-28 DIAGNOSIS — M54.12 CERVICAL RADICULOPATHY: ICD-10-CM

## 2025-08-28 DIAGNOSIS — G89.29 CHRONIC PAIN OF BOTH SHOULDERS: ICD-10-CM

## 2025-08-28 DIAGNOSIS — M17.11 PRIMARY OSTEOARTHRITIS OF RIGHT KNEE: ICD-10-CM

## 2025-08-28 DIAGNOSIS — M25.512 CHRONIC PAIN OF BOTH SHOULDERS: ICD-10-CM

## 2025-08-28 DIAGNOSIS — G89.29 CHRONIC NECK PAIN: ICD-10-CM

## 2025-08-28 DIAGNOSIS — M47.812 CERVICAL SPONDYLOSIS: ICD-10-CM

## 2025-08-28 RX ORDER — TIZANIDINE 4 MG/1
4 TABLET ORAL NIGHTLY PRN
Qty: 30 TABLET | Refills: 2 | Status: SHIPPED | OUTPATIENT
Start: 2025-08-28 | End: 2025-11-26

## 2025-08-28 RX ORDER — SEMAGLUTIDE 1.34 MG/ML
INJECTION, SOLUTION SUBCUTANEOUS
COMMUNITY
Start: 2025-06-16

## 2025-08-28 RX ORDER — METHYLPREDNISOLONE ACETATE 40 MG/ML
40 INJECTION, SUSPENSION INTRA-ARTICULAR; INTRALESIONAL; INTRAMUSCULAR; SOFT TISSUE
Status: DISCONTINUED | OUTPATIENT
Start: 2025-08-28 | End: 2025-08-28 | Stop reason: HOSPADM

## 2025-08-28 RX ORDER — LIDOCAINE HYDROCHLORIDE 10 MG/ML
2 INJECTION, SOLUTION INFILTRATION; PERINEURAL
Status: DISCONTINUED | OUTPATIENT
Start: 2025-08-28 | End: 2025-08-28 | Stop reason: HOSPADM

## 2025-08-28 RX ADMIN — METHYLPREDNISOLONE ACETATE 40 MG: 40 INJECTION, SUSPENSION INTRA-ARTICULAR; INTRALESIONAL; INTRAMUSCULAR; SOFT TISSUE at 08:08

## 2025-08-28 RX ADMIN — LIDOCAINE HYDROCHLORIDE 2 ML: 10 INJECTION, SOLUTION INFILTRATION; PERINEURAL at 08:08

## 2025-09-03 ENCOUNTER — TELEPHONE (OUTPATIENT)
Dept: PAIN MEDICINE | Facility: CLINIC | Age: 75
End: 2025-09-03
Payer: MEDICARE